# Patient Record
Sex: MALE | Race: WHITE | Employment: FULL TIME | URBAN - METROPOLITAN AREA
[De-identification: names, ages, dates, MRNs, and addresses within clinical notes are randomized per-mention and may not be internally consistent; named-entity substitution may affect disease eponyms.]

---

## 2017-03-31 ENCOUNTER — OFFICE VISIT (OUTPATIENT)
Dept: FAMILY MEDICINE CLINIC | Age: 52
End: 2017-03-31

## 2017-03-31 ENCOUNTER — HOSPITAL ENCOUNTER (OUTPATIENT)
Dept: LAB | Age: 52
Discharge: HOME | End: 2017-03-31
Payer: COMMERCIAL

## 2017-03-31 VITALS
WEIGHT: 254 LBS | BODY MASS INDEX: 33.66 KG/M2 | TEMPERATURE: 98.4 F | OXYGEN SATURATION: 93 % | HEART RATE: 88 BPM | HEIGHT: 73 IN | DIASTOLIC BLOOD PRESSURE: 88 MMHG | SYSTOLIC BLOOD PRESSURE: 140 MMHG

## 2017-03-31 DIAGNOSIS — E55.9 VITAMIN D DEFICIENCY: ICD-10-CM

## 2017-03-31 DIAGNOSIS — I10 ESSENTIAL HYPERTENSION: ICD-10-CM

## 2017-03-31 DIAGNOSIS — E78.5 HYPERLIPIDEMIA LDL GOAL <70: ICD-10-CM

## 2017-03-31 DIAGNOSIS — Z72.0 TOBACCO ABUSE: ICD-10-CM

## 2017-03-31 DIAGNOSIS — E11.69 DIABETES MELLITUS TYPE 2 IN OBESE (HCC): ICD-10-CM

## 2017-03-31 DIAGNOSIS — E66.9 DIABETES MELLITUS TYPE 2 IN OBESE (HCC): ICD-10-CM

## 2017-03-31 DIAGNOSIS — Z00.00 ANNUAL PHYSICAL EXAM: ICD-10-CM

## 2017-03-31 DIAGNOSIS — F41.9 ANXIETY: ICD-10-CM

## 2017-03-31 DIAGNOSIS — Z00.00 ANNUAL PHYSICAL EXAM: Primary | ICD-10-CM

## 2017-03-31 LAB
25(OH)D3 SERPL-MCNC: 19.1 NG/ML (ref 30–100)
ALBUMIN SERPL BCP-MCNC: 4.2 G/DL (ref 3.4–5)
ALBUMIN/GLOB SERPL: 1.4 {RATIO} (ref 0.8–1.7)
ALP SERPL-CCNC: 66 U/L (ref 45–117)
ALT SERPL-CCNC: 34 U/L (ref 16–61)
ANION GAP BLD CALC-SCNC: 10 MMOL/L (ref 3–18)
AST SERPL W P-5'-P-CCNC: 20 U/L (ref 15–37)
BASOPHILS # BLD AUTO: 0 K/UL (ref 0–0.06)
BASOPHILS # BLD: 0 % (ref 0–2)
BILIRUB SERPL-MCNC: 0.4 MG/DL (ref 0.2–1)
BUN SERPL-MCNC: 12 MG/DL (ref 7–18)
BUN/CREAT SERPL: 12 (ref 12–20)
CALCIUM SERPL-MCNC: 8.9 MG/DL (ref 8.5–10.1)
CHLORIDE SERPL-SCNC: 105 MMOL/L (ref 100–108)
CO2 SERPL-SCNC: 26 MMOL/L (ref 21–32)
CREAT SERPL-MCNC: 0.97 MG/DL (ref 0.6–1.3)
DIFFERENTIAL METHOD BLD: ABNORMAL
EOSINOPHIL # BLD: 0.2 K/UL (ref 0–0.4)
EOSINOPHIL NFR BLD: 2 % (ref 0–5)
ERYTHROCYTE [DISTWIDTH] IN BLOOD BY AUTOMATED COUNT: 12.4 % (ref 11.6–14.5)
ERYTHROCYTE [SEDIMENTATION RATE] IN BLOOD: 2 MM/HR (ref 0–15)
EST. AVERAGE GLUCOSE BLD GHB EST-MCNC: 134 MG/DL
GLOBULIN SER CALC-MCNC: 2.9 G/DL (ref 2–4)
GLUCOSE SERPL-MCNC: 127 MG/DL (ref 74–99)
HBA1C MFR BLD: 6.3 % (ref 4.2–5.6)
HCT VFR BLD AUTO: 45.5 % (ref 36–48)
HGB BLD-MCNC: 14.8 G/DL (ref 13–16)
LYMPHOCYTES # BLD AUTO: 19 % (ref 21–52)
LYMPHOCYTES # BLD: 1.4 K/UL (ref 0.9–3.6)
MCH RBC QN AUTO: 32.2 PG (ref 24–34)
MCHC RBC AUTO-ENTMCNC: 32.5 G/DL (ref 31–37)
MCV RBC AUTO: 98.9 FL (ref 74–97)
MONOCYTES # BLD: 0.9 K/UL (ref 0.05–1.2)
MONOCYTES NFR BLD AUTO: 11 % (ref 3–10)
NEUTS SEG # BLD: 5.3 K/UL (ref 1.8–8)
NEUTS SEG NFR BLD AUTO: 68 % (ref 40–73)
PLATELET # BLD AUTO: 204 K/UL (ref 135–420)
PMV BLD AUTO: 9.2 FL (ref 9.2–11.8)
POTASSIUM SERPL-SCNC: 4.7 MMOL/L (ref 3.5–5.5)
PROT SERPL-MCNC: 7.1 G/DL (ref 6.4–8.2)
RBC # BLD AUTO: 4.6 M/UL (ref 4.7–5.5)
SODIUM SERPL-SCNC: 141 MMOL/L (ref 136–145)
T4 FREE SERPL-MCNC: 1 NG/DL (ref 0.7–1.5)
TSH SERPL DL<=0.05 MIU/L-ACNC: 1.74 UIU/ML (ref 0.36–3.74)
WBC # BLD AUTO: 7.7 K/UL (ref 4.6–13.2)

## 2017-03-31 PROCEDURE — 80053 COMPREHEN METABOLIC PANEL: CPT | Performed by: INTERNAL MEDICINE

## 2017-03-31 PROCEDURE — 82172 ASSAY OF APOLIPOPROTEIN: CPT | Performed by: INTERNAL MEDICINE

## 2017-03-31 PROCEDURE — 83036 HEMOGLOBIN GLYCOSYLATED A1C: CPT | Performed by: INTERNAL MEDICINE

## 2017-03-31 PROCEDURE — 84443 ASSAY THYROID STIM HORMONE: CPT | Performed by: INTERNAL MEDICINE

## 2017-03-31 PROCEDURE — 80061 LIPID PANEL: CPT | Performed by: INTERNAL MEDICINE

## 2017-03-31 PROCEDURE — 36415 COLL VENOUS BLD VENIPUNCTURE: CPT | Performed by: INTERNAL MEDICINE

## 2017-03-31 PROCEDURE — 85652 RBC SED RATE AUTOMATED: CPT | Performed by: INTERNAL MEDICINE

## 2017-03-31 PROCEDURE — 85025 COMPLETE CBC W/AUTO DIFF WBC: CPT | Performed by: INTERNAL MEDICINE

## 2017-03-31 PROCEDURE — 84439 ASSAY OF FREE THYROXINE: CPT | Performed by: INTERNAL MEDICINE

## 2017-03-31 PROCEDURE — 82306 VITAMIN D 25 HYDROXY: CPT | Performed by: INTERNAL MEDICINE

## 2017-03-31 RX ORDER — LOSARTAN POTASSIUM 50 MG/1
50 TABLET ORAL DAILY
Qty: 90 TAB | Refills: 3 | Status: SHIPPED | OUTPATIENT
Start: 2017-03-31 | End: 2018-03-20 | Stop reason: SDUPTHER

## 2017-03-31 RX ORDER — SIMVASTATIN 10 MG/1
10 TABLET, FILM COATED ORAL
Qty: 90 TAB | Refills: 3 | Status: SHIPPED | OUTPATIENT
Start: 2017-03-31 | End: 2018-03-20 | Stop reason: SDUPTHER

## 2017-03-31 NOTE — PROGRESS NOTES
.     Progress Note    Patient: Nandini Sandoval MRN: [id number]  SSN: [id number]    YOB: 1965  Age: 54 y.o. Sex: male          Subjective:   Nandini Sandoval is a 54 y.o. male who is here for annual physical. The patient mentions that he got a new job at [employer]. He states that the work environment. 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 He states that his new position also very expansive. He saw his dermatologist a few weeks ago and they did a biopsy under his chin and behind his ear. In regard to his diabetes he has been watching his diet and trying to cut back on cheese. He states that he has also been increasing his vegetable intake. He states that he is not getting the same amount of exercise that he did on his previous position. He states that at his job he has a gym and rarely goes to it. On occasion he will walk on the treadmill at the gym at work. Patient denies any more tremors in his hand. Objective:     Past Medical History:   Diagnosis Date    Diabetes ([address]     Eczema     History of seasonal allergies       Visit Vitals    /88 (BP 1 Location: Left arm, BP Patient Position: Sitting)    Pulse 88    Temp 98.4 °F (36.9 °C) (Oral)    Ht 6' 1\" (1.854 m)    Wt 254 lb (115.2 kg)    SpO2 93%    BMI 33.51 kg/m2       Review of Systems:  Pertinent ROS noted in HPI     Physical Exam:   GENERAL: alert, cooperative, no distress, appears stated age, moderately obese  EYE: conjunctivae/corneas clear. PERRL, EOM's intact. Fundi benign  LYMPHATIC: Cervical, supraclavicular, and axillary nodes normal.   THROAT & NECK: normal and no erythema or exudates noted. LUNG: clear to auscultation bilaterally  HEART: regular rate and rhythm, S1, S2 normal, no murmur, click, rub or gallop  ABDOMEN: soft, non-tender. Bowel sounds normal. No masses,  no organomegaly.   EXTREMITIES:  No edema or cyanosis   SKIN: Eczematous patches noted on face and dorsal aspects of hands. Lab/Data Review:    Lab Results   Component Value Date/Time    Hemoglobin A1c 6.0 08/02/2016 12:07 PM    Hemoglobin A1c (POC) 6.6 09/29/2011 10:22 AM     Lab Results   Component Value Date/Time    VITAMIN D, 25-HYDROXY 32.8 11/10/2015 03:13 PM         Lab Results   Component Value Date/Time    WBC 7.7 11/10/2015 03:13 PM    HGB 14.2 11/10/2015 03:13 PM    HCT 41.5 11/10/2015 03:13 PM    PLATELET 370 75/55/6449 03:13 PM    MCV 91 11/10/2015 03:13 PM       Lab Results   Component Value Date/Time    Sodium 140 11/11/2016 10:31 AM    Potassium 3.8 11/11/2016 10:31 AM    Chloride 100 11/11/2016 10:31 AM    CO2 28 11/11/2016 10:31 AM    Anion gap 12 11/11/2016 10:31 AM    Glucose 140 11/11/2016 10:31 AM    BUN 11 11/11/2016 10:31 AM    Creatinine 1.07 11/11/2016 10:31 AM    BUN/Creatinine ratio 10 11/11/2016 10:31 AM    GFR est AA >60 11/11/2016 10:31 AM    GFR est non-AA >60 11/11/2016 10:31 AM    Calcium 9.3 11/11/2016 10:31 AM    Bilirubin, total 1.2 11/11/2016 10:31 AM    AST (SGOT) 23 11/11/2016 10:31 AM    Alk. phosphatase 65 11/11/2016 10:31 AM    Protein, total 7.0 11/11/2016 10:31 AM    Albumin 4.2 11/11/2016 10:31 AM    Globulin 2.8 11/11/2016 10:31 AM    A-G Ratio 1.5 11/11/2016 10:31 AM    ALT (SGPT) 39 11/11/2016 10:31 AM     Lab Results   Component Value Date/Time    Cholesterol, total 176 11/11/2016 10:31 AM    HDL Cholesterol 79 11/11/2016 10:31 AM    LDL, calculated 74 11/11/2016 10:31 AM    VLDL, calculated 14 06/23/2015 10:03 AM    Triglyceride 116 11/11/2016 10:31 AM    CHOL/HDL Ratio 3.7 06/10/2010 03:03 PM           Assessment:     1.) Annual Physical: No acute findings on exam. The patient declined preventive immunizations. Labs ordered as noted below. 2.) Diabetes Mellitus Type 2:  HgbA1C drawn in the office today. He will continue lifestyle change. Urine microalbumin drawn in the office today.          3.) Hypertension:  He was provided with BP log to record blood pressure readings. Patient will continue on Losartan which was reordered. 4.) Tremulousness: Resolved. 5.) Hypercholesterolemia: Lipid cascade drawn in the office today. Patient's Simvastatin 10 mg daily was reordered. 6.) Eczema: Stable on Temovate. He will continue to follow with Dermatology. 7.) Tobacco Abuse:  Patient once more admonished to quit. Patient will return in 6 months for follow up.      Plan:     Orders Placed This Encounter    LIPID CASCADE W/REFLEX APO B    METABOLIC PANEL, COMPREHENSIVE    CBC WITH AUTOMATED DIFF    SED RATE (ESR)    TSH 3RD GENERATION    T4, FREE    HEMOGLOBIN A1C WITH EAG    VITAMIN D, 25 HYDROXY    MICROALBUMIN, UR, RAND W/ MICROALBUMIN/CREA RATIO    simvastatin (ZOCOR) 10 mg tablet    losartan (COZAAR) 50 mg tablet         Signed By: Pro Durant DO     March 31, 2017

## 2017-03-31 NOTE — MR AVS SNAPSHOT
Visit Information Date & Time Provider Department Dept. Phone Encounter #  
 3/31/2017  8:30 AM [id number] Colleen Macias [id number] Follow-up Instructions Return in about 6 months (around 9/30/2017) for Regular Follow Up. Dariel Tong Upcoming Health Maintenance Date Due  
 EYE EXAM RETINAL OR DILATED Q1 8/25/1977 Pneumococcal 19-64 Medium Risk (1 of 1 - PPSV23) 8/25/1986 MICROALBUMIN Q1 6/23/2016 INFLUENZA AGE 9 TO ADULT 8/1/2016 FOOT EXAM Q1 12/3/2016 HEMOGLOBIN A1C Q6M 2/2/2017 LIPID PANEL Q1 11/11/2017 DTaP/Tdap/Td series (2 - Td) 3/31/2025 Allergies as of 3/31/2017  Review Complete On: 3/31/2017 By: Raoul Mckeon LPN No Known Allergies Current Immunizations  Reviewed on 3/31/2015 Name Date Influenza Vaccine 10/30/2014 Tdap 3/31/2015 Not reviewed this visit You Were Diagnosed With   
  
 Codes Comments Annual physical exam    -  Primary ICD-10-CM: Z00.00 ICD-9-CM: V70.0 Hyperlipidemia LDL goal <70     ICD-10-CM: E78.5 ICD-9-CM: 272.4 Diabetes mellitus type 2 in obese (HCC)     ICD-10-CM: E11.9, E66.9 ICD-9-CM: 250.00, 278.00 Anxiety     ICD-10-CM: F41.9 ICD-9-CM: 300.00 Elevated blood pressure     ICD-10-CM: I10 
ICD-9-CM: 401.9 Essential hypertension     ICD-10-CM: I10 
ICD-9-CM: 401.9 Vitamin D deficiency     ICD-10-CM: E55.9 ICD-9-CM: 268.9 Vitals BP Pulse Temp Height(growth percentile) Weight(growth percentile) SpO2  
 140/88 (BP 1 Location: Left arm, BP Patient Position: Sitting) 88 98.4 °F (36.9 °C) (Oral) 6' 1\" (1.854 m) 254 lb (115.2 kg) 93% BMI Smoking Status 33.51 kg/m2 Current Every Day Smoker Vitals History BMI and BSA Data Body Mass Index Body Surface Area  
 33.51 kg/m 2 2.44 m 2 Preferred Pharmacy Pharmacy Name Phone  [address] [facility] [phone] Your Updated Medication List  
  
   
This list is accurate as of: 3/31/17  9:04 AM.  Always use your most recent med list. ALLEGRA-D 24 HOUR PO Take  by mouth daily. Blood Glucose Strip-Disp Meter Kit 120 Strips by Does Not Apply route daily. Blood-Glucose Meter monitoring kit Check Blood Sugar once daily  
  
 clobetasol 0.05 % ointment Commonly known as:  TEMOVATE  
APPLY TO THE AFFECTED AREA TWICE DAILY  
  
 diazePAM 5 mg tablet Commonly known as:  VALIUM Take 1 Tab by mouth every twelve (12) hours as needed. EPINEPHrine 0.3 mg/0.3 mL injection Commonly known as:  EPIPEN  
0.3 mL by IntraMUSCular route once as needed. glucose blood VI test strips strip Commonly known as:  ASCENSIA AUTODISC VI, ONE TOUCH ULTRA TEST VI Check Blood Sugar Fasting Once a day  
  
 lancets 32 gauge Misc  
1 Package by Does Not Apply route daily. losartan 50 mg tablet Commonly known as:  COZAAR Take 1 Tab by mouth daily. metFORMIN 500 mg tablet Commonly known as:  GLUCOPHAGE Take 1 Tab by mouth daily (with breakfast). mometasone 50 mcg/actuation nasal spray Commonly known as:  NASONEX  
INHALE 1 TO 2 SPRAYS IN EACH NOSTRIL DAILY  
  
 olopatadine 0.1 % ophthalmic solution Commonly known as:  PATANOL Administer 1 Drop to both eyes two (2) times a day. simvastatin 10 mg tablet Commonly known as:  ZOCOR Take 1 Tab by mouth nightly. Prescriptions Sent to Pharmacy Refills  
 simvastatin (ZOCOR) 10 mg tablet 3 Sig: Take 1 Tab by mouth nightly. Class: Normal  
 Pharmacy: [address], [address] Ph #: [phone] Route: Oral  
 losartan (COZAAR) 50 mg tablet 3 Sig: Take 1 Tab by mouth daily. Class: Normal  
 Pharmacy: [address], [address] Ph #: [phone]  Route: Oral  
  
 Follow-up Instructions Return in about 6 months (around 9/30/2017) for Regular Follow Up. Iván Lopez To-Do List   
 03/31/2017 Lab:  HEMOGLOBIN A1C WITH EAG   
  
 03/31/2017 Lab:  LIPID CASCADE W/REFLEX APO B   
  
 03/31/2017 Lab:  MICROALBUMIN, UR, RAND W/ MICROALBUMIN/CREA RATIO   
  
 03/31/2017 Lab:  VITAMIN D, 25 HYDROXY Around 06/29/2017 Lab:  CBC WITH AUTOMATED DIFF Around 06/29/2017 Lab:  METABOLIC PANEL, COMPREHENSIVE Around 06/29/2017 Lab:  SED RATE (ESR) Around 06/29/2017 Lab:  T4, FREE Around 06/29/2017 Lab:  TSH 3RD GENERATION Patient Instructions 1.) Continue to Monitor blood pressure. Goal is to keep it below 130/80. 
 
2.) Continue to cut down on cigarette smoking.  
 
3.) Continue to use cholesterol meds. 4.) Elevate legs at the end of the day. 5.) If leg pain worsens let us know. Tennis Elbow: Exercises Your Care Instructions Here are some examples of typical rehabilitation exercises for your condition. Start each exercise slowly. Ease off the exercise if you start to have pain. Your doctor or physical therapist will tell you when you can start these exercises and which ones will work best for you. How to do the exercises Wrist flexor stretch 1. Extend your arm in front of you with your palm up. 2. Bend your wrist, pointing your hand toward the floor. 3. With your other hand, gently bend your wrist farther until you feel a mild to moderate stretch in your forearm. 4. Hold for at least 15 to 30 seconds. Repeat 2 to 4 times. Wrist extensor stretch Repeat steps 1 to 4 of the stretch above but begin with your extended hand palm down. Ball or sock squeeze 1. Hold a tennis ball (or a rolled-up sock) in your hand. 2. Make a fist around the ball (or sock) and squeeze. 3. Hold for about 6 seconds, and then relax for up to 10 seconds. 4. Repeat 8 to 12 times. 5. Switch the ball (or sock) to your other hand and do 8 to 12 times. Wrist deviation 1. Sit so that your arm is supported but your hand hangs off the edge of a flat surface, such as a table. 2. Hold your hand out like you are shaking hands with someone. 3. Move your hand up and down. 4. Repeat this motion 8 to 12 times. 5. Switch arms. 6. Try to do this exercise twice with each hand. Wrist curls 1. Place your forearm on a table with your hand hanging over the edge of the table, palm up. 2. Place a 1- to 2-pound weight in your hand. This may be a dumbbell, a can of food, or a filled water bottle. 3. Slowly raise and lower the weight while keeping your forearm on the table and your palm facing up. 4. Repeat this motion 8 to 12 times. 5. Switch arms, and do steps 1 through 4. 
6. Repeat with your hand facing down toward the floor. Switch arms. Biceps curls 1. Sit leaning forward with your legs slightly spread and your left hand on your left thigh. 2. Place your right elbow on your right thigh, and hold the weight with your forearm horizontal. 
3. Slowly curl the weight up and toward your chest. 
4. Repeat this motion 8 to 12 times. 5. Switch arms, and do steps 1 through 4. Follow-up care is a key part of your treatment and safety. Be sure to make and go to all appointments, and call your doctor if you are having problems. It's also a good idea to know your test results and keep a list of the medicines you take. Where can you learn more? Go to http://kaye-tanvir.info/. Enter D919 in the search box to learn more about \"Tennis Elbow: Exercises. \" Current as of: May 23, 2016 Content Version: 11.2 © 5598-1906 [email or web address], Incorporated. Care instructions adapted under license by [employer] (which disclaims liability or warranty for this information).  If you have questions about a medical condition or this instruction, always ask your healthcare professional. [address] any warranty or liability for your use of this information. Introducing [facility] [place] [facility]! Samm Purvis introduces [employer] patient portal. Now you can access parts of your medical record, email your doctor's office, and request medication refills online. 1. In your internet browser, go to [email or web address] 2. Click on the First Time User? Click Here link in the Sign In box. You will see the New Member Sign Up page. 3. Enter your [employer] Access Code exactly as it appears below. You will not need to use this code after youve completed the sign-up process. If you do not sign up before the expiration date, you must request a new code. · [employer] Access Code: [email or web address] Expires: 6/29/2017  8:16 AM 
 
4. Enter the last four digits of your Social Security Number (xxxx) and Date of Birth (mm/dd/yyyy) as indicated and click Submit. You will be taken to the next sign-up page. 5. Create a [employer] ID. This will be your [employer] login ID and cannot be changed, so think of one that is secure and easy to remember. 6. Create a [employer] password. You can change your password at any time. 7. Enter your Password Reset Question and Answer. This can be used at a later time if you forget your password. 8. Enter your e-mail address. You will receive e-mail notification when new information is available in [address] 9. Click Sign Up. You can now view and download portions of your medical record. 10. Click the Download Summary menu link to download a portable copy of your medical information. If you have questions, please visit the Frequently Asked Questions section of the [employer] website. Remember, [employer] is NOT to be used for urgent needs. For medical emergencies, dial 911. Now available from your iPhone and Android! Please provide this summary of care documentation to your next provider. Your primary care clinician is listed as Jasiel Multani. If you have any questions after today's visit, please call [phone].

## 2017-03-31 NOTE — PATIENT INSTRUCTIONS
1. ) Continue to Monitor blood pressure. Goal is to keep it below 130/80.    2.) Continue to cut down on cigarette smoking.     3.) Continue to use cholesterol meds. 4.) Elevate legs at the end of the day. 5.) If leg pain worsens let us know. Tennis Elbow: Exercises  Your Care Instructions  Here are some examples of typical rehabilitation exercises for your condition. Start each exercise slowly. Ease off the exercise if you start to have pain. Your doctor or physical therapist will tell you when you can start these exercises and which ones will work best for you. How to do the exercises  Wrist flexor stretch    1. Extend your arm in front of you with your palm up. 2. Bend your wrist, pointing your hand toward the floor. 3. With your other hand, gently bend your wrist farther until you feel a mild to moderate stretch in your forearm. 4. Hold for at least 15 to 30 seconds. Repeat 2 to 4 times. Wrist extensor stretch    Repeat steps 1 to 4 of the stretch above but begin with your extended hand palm down. Ball or sock squeeze    1. Hold a tennis ball (or a rolled-up sock) in your hand. 2. Make a fist around the ball (or sock) and squeeze. 3. Hold for about 6 seconds, and then relax for up to 10 seconds. 4. Repeat 8 to 12 times. 5. Switch the ball (or sock) to your other hand and do 8 to 12 times. Wrist deviation    1. Sit so that your arm is supported but your hand hangs off the edge of a flat surface, such as a table. 2. Hold your hand out like you are shaking hands with someone. 3. Move your hand up and down. 4. Repeat this motion 8 to 12 times. 5. Switch arms. 6. Try to do this exercise twice with each hand. Wrist curls    1. Place your forearm on a table with your hand hanging over the edge of the table, palm up. 2. Place a 1- to 2-pound weight in your hand. This may be a dumbbell, a can of food, or a filled water bottle.   3. Slowly raise and lower the weight while keeping your forearm on the table and your palm facing up. 4. Repeat this motion 8 to 12 times. 5. Switch arms, and do steps 1 through 4.  6. Repeat with your hand facing down toward the floor. Switch arms. Biceps curls    1. Sit leaning forward with your legs slightly spread and your left hand on your left thigh. 2. Place your right elbow on your right thigh, and hold the weight with your forearm horizontal.  3. Slowly curl the weight up and toward your chest.  4. Repeat this motion 8 to 12 times. 5. Switch arms, and do steps 1 through 4. Follow-up care is a key part of your treatment and safety. Be sure to make and go to all appointments, and call your doctor if you are having problems. It's also a good idea to know your test results and keep a list of the medicines you take. Where can you learn more? Go to http://kaye-tanvir.info/. Enter H802 in the search box to learn more about \"Tennis Elbow: Exercises. \"  Current as of: May 23, 2016  Content Version: 11.2  © 5403-3830 [employer], Incorporated. Care instructions adapted under license by [employer] (which disclaims liability or warranty for this information). If you have questions about a medical condition or this instruction, always ask your healthcare professional. [address] any warranty or liability for your use of this information.

## 2017-03-31 NOTE — PROGRESS NOTES
1. Have you been to the ER, urgent care clinic since your last visit? Hospitalized since your last visit? No    2. Have you seen or consulted any other health care providers outside of the [facility] since your last visit? Include any pap smears or colon screening.  No    Is someone accompanying this pt? no    Is the patient using any DME equipment during OV? no      Chief Complaint   Patient presents with    Complete Physical

## 2017-04-02 ENCOUNTER — TELEPHONE (OUTPATIENT)
Dept: FAMILY MEDICINE CLINIC | Age: 52
End: 2017-04-02

## 2017-04-02 NOTE — TELEPHONE ENCOUNTER
Shad Kaufman,   Please let the patient know that his HgbA1C crept up to 6.3. He won't have to go back to metformin but he needs to be very strict with his diet and exercise regimen. I don't want him to get back to up to 6.5. Thanks.     LUISA

## 2017-04-05 LAB
APO B SERPL-MCNC: 85 MG/DL (ref 52–135)
CHOLEST SERPL-MCNC: 158 MG/DL (ref 100–199)
HDLC SERPL-MCNC: 66 MG/DL
LDLC SERPL CALC-MCNC: 71 MG/DL (ref 0–99)
LDLC/HDLC SERPL: 1.1 RATIO UNITS (ref 0–3.6)
NONHDLC SERPL-MCNC: 92 MG/DL (ref 0–129)
TRIGL SERPL-MCNC: 107 MG/DL (ref 0–149)

## 2017-04-06 ENCOUNTER — TELEPHONE (OUTPATIENT)
Dept: FAMILY MEDICINE CLINIC | Age: 52
End: 2017-04-06

## 2018-06-08 ENCOUNTER — HOSPITAL ENCOUNTER (OUTPATIENT)
Dept: LAB | Age: 53
Discharge: HOME | End: 2018-06-08
Payer: COMMERCIAL

## 2018-06-08 ENCOUNTER — OFFICE VISIT (OUTPATIENT)
Dept: FAMILY MEDICINE CLINIC | Age: 53
End: 2018-06-08

## 2018-06-08 VITALS
HEART RATE: 70 BPM | HEIGHT: 73 IN | DIASTOLIC BLOOD PRESSURE: 87 MMHG | TEMPERATURE: 98.1 F | OXYGEN SATURATION: 98 % | RESPIRATION RATE: 17 BRPM | SYSTOLIC BLOOD PRESSURE: 152 MMHG | WEIGHT: 257 LBS | BODY MASS INDEX: 34.06 KG/M2

## 2018-06-08 DIAGNOSIS — F41.9 ANXIETY: ICD-10-CM

## 2018-06-08 DIAGNOSIS — I10 ESSENTIAL HYPERTENSION: ICD-10-CM

## 2018-06-08 DIAGNOSIS — E11.9 TYPE 2 DIABETES MELLITUS WITHOUT COMPLICATION, WITHOUT LONG-TERM CURRENT USE OF INSULIN (HCC): ICD-10-CM

## 2018-06-08 DIAGNOSIS — Z00.00 ANNUAL PHYSICAL EXAM: Primary | ICD-10-CM

## 2018-06-08 DIAGNOSIS — E55.9 VITAMIN D DEFICIENCY: ICD-10-CM

## 2018-06-08 DIAGNOSIS — E08.00 DIABETES MELLITUS DUE TO UNDERLYING CONDITION WITH HYPEROSMOLARITY WITHOUT COMA, UNSPECIFIED WHETHER LONG TERM INSULIN USE (HCC): ICD-10-CM

## 2018-06-08 DIAGNOSIS — E66.9 DIABETES MELLITUS TYPE 2 IN OBESE (HCC): ICD-10-CM

## 2018-06-08 DIAGNOSIS — E11.69 DIABETES MELLITUS TYPE 2 IN OBESE (HCC): ICD-10-CM

## 2018-06-08 DIAGNOSIS — E78.5 HYPERLIPIDEMIA LDL GOAL <70: ICD-10-CM

## 2018-06-08 DIAGNOSIS — Z00.00 ANNUAL PHYSICAL EXAM: ICD-10-CM

## 2018-06-08 DIAGNOSIS — Z12.11 SCREEN FOR COLON CANCER: ICD-10-CM

## 2018-06-08 LAB
ALBUMIN SERPL-MCNC: 4.1 G/DL (ref 3.4–5)
ALBUMIN/GLOB SERPL: 1.5 {RATIO} (ref 0.8–1.7)
ALP SERPL-CCNC: 73 U/L (ref 45–117)
ALT SERPL-CCNC: 37 U/L (ref 16–61)
ANION GAP SERPL CALC-SCNC: 10 MMOL/L (ref 3–18)
AST SERPL-CCNC: 21 U/L (ref 15–37)
BASOPHILS # BLD: 0 K/UL (ref 0–0.06)
BASOPHILS NFR BLD: 0 % (ref 0–2)
BILIRUB SERPL-MCNC: 0.7 MG/DL (ref 0.2–1)
BUN SERPL-MCNC: 9 MG/DL (ref 7–18)
BUN/CREAT SERPL: 8 (ref 12–20)
CALCIUM SERPL-MCNC: 9.4 MG/DL (ref 8.5–10.1)
CHLORIDE SERPL-SCNC: 104 MMOL/L (ref 100–108)
CHOLEST SERPL-MCNC: 183 MG/DL
CO2 SERPL-SCNC: 29 MMOL/L (ref 21–32)
CREAT SERPL-MCNC: 1.06 MG/DL (ref 0.6–1.3)
DIFFERENTIAL METHOD BLD: ABNORMAL
EOSINOPHIL # BLD: 0.1 K/UL (ref 0–0.4)
EOSINOPHIL NFR BLD: 1 % (ref 0–5)
ERYTHROCYTE [DISTWIDTH] IN BLOOD BY AUTOMATED COUNT: 13.1 % (ref 11.6–14.5)
ERYTHROCYTE [SEDIMENTATION RATE] IN BLOOD: 2 MM/HR (ref 0–15)
EST. AVERAGE GLUCOSE BLD GHB EST-MCNC: 157 MG/DL
GLOBULIN SER CALC-MCNC: 2.8 G/DL (ref 2–4)
GLUCOSE SERPL-MCNC: 118 MG/DL (ref 74–99)
HBA1C MFR BLD: 7.1 % (ref 4.2–5.6)
HCT VFR BLD AUTO: 44.4 % (ref 36–48)
HDLC SERPL-MCNC: 66 MG/DL (ref 40–60)
HDLC SERPL: 2.8 {RATIO} (ref 0–5)
HGB BLD-MCNC: 14.4 G/DL (ref 13–16)
LDLC SERPL CALC-MCNC: 94.8 MG/DL (ref 0–100)
LIPID PROFILE,FLP: ABNORMAL
LYMPHOCYTES # BLD: 2.1 K/UL (ref 0.9–3.6)
LYMPHOCYTES NFR BLD: 26 % (ref 21–52)
MCH RBC QN AUTO: 31.4 PG (ref 24–34)
MCHC RBC AUTO-ENTMCNC: 32.4 G/DL (ref 31–37)
MCV RBC AUTO: 96.9 FL (ref 74–97)
MONOCYTES # BLD: 0.7 K/UL (ref 0.05–1.2)
MONOCYTES NFR BLD: 8 % (ref 3–10)
NEUTS SEG # BLD: 5.4 K/UL (ref 1.8–8)
NEUTS SEG NFR BLD: 65 % (ref 40–73)
PLATELET # BLD AUTO: 175 K/UL (ref 135–420)
PMV BLD AUTO: 8.8 FL (ref 9.2–11.8)
POTASSIUM SERPL-SCNC: 4.1 MMOL/L (ref 3.5–5.5)
PROT SERPL-MCNC: 6.9 G/DL (ref 6.4–8.2)
PSA SERPL-MCNC: 0.8 NG/ML (ref 0–4)
RBC # BLD AUTO: 4.58 M/UL (ref 4.7–5.5)
SODIUM SERPL-SCNC: 143 MMOL/L (ref 136–145)
TRIGL SERPL-MCNC: 111 MG/DL (ref ?–150)
VLDLC SERPL CALC-MCNC: 22.2 MG/DL
WBC # BLD AUTO: 8.3 K/UL (ref 4.6–13.2)

## 2018-06-08 PROCEDURE — 85025 COMPLETE CBC W/AUTO DIFF WBC: CPT | Performed by: INTERNAL MEDICINE

## 2018-06-08 PROCEDURE — 85652 RBC SED RATE AUTOMATED: CPT | Performed by: INTERNAL MEDICINE

## 2018-06-08 PROCEDURE — 80061 LIPID PANEL: CPT | Performed by: INTERNAL MEDICINE

## 2018-06-08 PROCEDURE — 83036 HEMOGLOBIN GLYCOSYLATED A1C: CPT | Performed by: INTERNAL MEDICINE

## 2018-06-08 PROCEDURE — 36415 COLL VENOUS BLD VENIPUNCTURE: CPT | Performed by: INTERNAL MEDICINE

## 2018-06-08 PROCEDURE — 84153 ASSAY OF PSA TOTAL: CPT | Performed by: INTERNAL MEDICINE

## 2018-06-08 PROCEDURE — 82043 UR ALBUMIN QUANTITATIVE: CPT | Performed by: INTERNAL MEDICINE

## 2018-06-08 PROCEDURE — 82306 VITAMIN D 25 HYDROXY: CPT | Performed by: INTERNAL MEDICINE

## 2018-06-08 PROCEDURE — 80053 COMPREHEN METABOLIC PANEL: CPT | Performed by: INTERNAL MEDICINE

## 2018-06-08 RX ORDER — SIMVASTATIN 10 MG/1
TABLET, FILM COATED ORAL
Qty: 90 TAB | Refills: 1 | Status: SHIPPED | OUTPATIENT
Start: 2018-06-08 | End: 2018-07-13 | Stop reason: SDUPTHER

## 2018-06-08 RX ORDER — METFORMIN HYDROCHLORIDE 500 MG/1
500 TABLET ORAL
Qty: 90 TAB | Refills: 1 | Status: SHIPPED | OUTPATIENT
Start: 2018-06-08 | End: 2018-07-13 | Stop reason: SDUPTHER

## 2018-06-08 RX ORDER — CLOBETASOL PROPIONATE 0.5 MG/G
OINTMENT TOPICAL
Qty: 30 G | Refills: 3 | Status: SHIPPED | OUTPATIENT
Start: 2018-06-08 | End: 2019-07-19 | Stop reason: ALTCHOICE

## 2018-06-08 RX ORDER — LOSARTAN POTASSIUM 50 MG/1
TABLET ORAL
Qty: 90 TAB | Refills: 1 | Status: SHIPPED | OUTPATIENT
Start: 2018-06-08 | End: 2018-07-13 | Stop reason: SDUPTHER

## 2018-06-08 RX ORDER — DIAZEPAM 5 MG/1
5 TABLET ORAL
Qty: 60 TAB | Refills: 0 | Status: SHIPPED | OUTPATIENT
Start: 2018-06-08 | End: 2019-03-13 | Stop reason: SDUPTHER

## 2018-06-08 NOTE — PATIENT INSTRUCTIONS
Please contact our office if you have any questions about your visit today. 1.) Please keep a log of your blood pressures. The goal is to keep it below 140/90 regularly. 2.) Bring log back with you on next visit. We will determine if you need an adjustment on your BP meds. 3.) Please call GI for colonoscopy screening.

## 2018-06-08 NOTE — PROGRESS NOTES
.         Progress Note    Patient: Kendal Santos MRN: [id number]  SSN: [id number]    YOB: 1965  Age: 50 y.o. Sex: male          Subjective:   Kendal Santos is a 50 y.o. male who is here for annual physical. The patient mentions that he got a new job at [employer]. He mentions that this new position is less stressful for him. He mentions that he checks his BP at home. He does get numbers in the 140 range. He also asks for refills on his maintenance medications. Visit from 3/31/2017   He states that the work environment. 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 He states that his new position also very expansive. He saw his dermatologist a few weeks ago and they did a biopsy under his chin and behind his ear. In regard to his diabetes he has been watching his diet and trying to cut back on cheese. He states that he has also been increasing his vegetable intake. He states that he is not getting the same amount of exercise that he did on his previous position. He states that at his job he has a gym and rarely goes to it. On occasion he will walk on the treadmill at the gym at work. Patient denies any more tremors in his hand. Objective:     Past Medical History:   Diagnosis Date    Diabetes ([address]     Eczema     History of seasonal allergies       Visit Vitals    /87 (BP 1 Location: Right arm, BP Patient Position: Sitting)    Pulse 70    Temp 98.1 °F (36.7 °C) (Oral)    Resp 17    Ht 6' 1\" (1.854 m)    Wt 257 lb (116.6 kg)    SpO2 98%    BMI 33.91 kg/m2       Review of Systems:  Pertinent ROS noted in HPI     Physical Exam:   GENERAL: alert, cooperative, no distress, appears stated age, moderately obese  EYE: conjunctivae/corneas clear. PERRL, EOM's intact. Fundi benign  LYMPHATIC: Cervical, supraclavicular, and axillary nodes normal.   THROAT & NECK: normal and no erythema or exudates noted.    LUNG: clear to auscultation bilaterally  HEART: regular rate and rhythm, S1, S2 normal, no murmur, click, rub or gallop  ABDOMEN: soft, non-tender. Bowel sounds normal. No masses,  no organomegaly. EXTREMITIES:  No edema or cyanosis   SKIN: Eczematous patches noted on face and dorsal aspects of hands. Lab/Data Review:    Lab Results   Component Value Date/Time    Hemoglobin A1c 6.3 (H) 03/31/2017 09:06 AM    Hemoglobin A1c (POC) 6.6 09/29/2011 10:22 AM     Lab Results   Component Value Date/Time    Vitamin D 25-Hydroxy 19.1 (L) 03/31/2017 09:06 AM         Lab Results   Component Value Date/Time    WBC 7.7 03/31/2017 09:06 AM    HGB 14.8 03/31/2017 09:06 AM    HCT 45.5 03/31/2017 09:06 AM    PLATELET 494 83/97/8485 09:06 AM    MCV 98.9 (H) 03/31/2017 09:06 AM       Lab Results   Component Value Date/Time    Sodium 141 03/31/2017 09:06 AM    Potassium 4.7 03/31/2017 09:06 AM    Chloride 105 03/31/2017 09:06 AM    CO2 26 03/31/2017 09:06 AM    Anion gap 10 03/31/2017 09:06 AM    Glucose 127 (H) 03/31/2017 09:06 AM    BUN 12 03/31/2017 09:06 AM    Creatinine 0.97 03/31/2017 09:06 AM    BUN/Creatinine ratio 12 03/31/2017 09:06 AM    GFR est AA >60 03/31/2017 09:06 AM    GFR est non-AA >60 03/31/2017 09:06 AM    Calcium 8.9 03/31/2017 09:06 AM    Bilirubin, total 0.4 03/31/2017 09:06 AM    AST (SGOT) 20 03/31/2017 09:06 AM    Alk.  phosphatase 66 03/31/2017 09:06 AM    Protein, total 7.1 03/31/2017 09:06 AM    Albumin 4.2 03/31/2017 09:06 AM    Globulin 2.9 03/31/2017 09:06 AM    A-G Ratio 1.4 03/31/2017 09:06 AM    ALT (SGPT) 34 03/31/2017 09:06 AM     Lab Results   Component Value Date/Time    Cholesterol, total 158 03/31/2017 09:06 AM    HDL Cholesterol 66 03/31/2017 09:06 AM    LDL, calculated 71 03/31/2017 09:06 AM    VLDL, calculated 14 06/23/2015 10:03 AM    Triglyceride 107 03/31/2017 09:06 AM    CHOL/HDL Ratio 3.7 06/10/2010 03:03 PM           Assessment:     1.) Annual Physical: No acute findings on exam.     2.) Hypertension: Patient was advised to log his blood pressure readings. Patient will continue on Losartan which was reordered. If readings are still high then I will increase his losartan to 100 mg.      3.) Diabetes Mellitus Type 2: Patient was reordered metformin but he did note the fact that he has not been on the medication since last year. HgbA1C drawn in the office today. He will continue lifestyle change. 4.) Hypercholesterolemia: Lipid panel drawn in the office today. Patient's Simvastatin 10 mg daily was reordered. 5.) Eczema: Temovate reordered. He will continue to follow with Dermatology. 6.) Intermittent Anxiety: Patient's Valium reordered. 7.) Tobacco Abuse: On next visit will discuss quitting techniques. 8.) Preventive Screenings: Labs ordered as noted below. Patient referred to GI for screening colonoscopy. PSA ordered. Patient will return in 1 month for follow up on blood pressure readings.      Plan:     Orders Placed This Encounter    LIPID PANEL    VITAMIN D, 25 HYDROXY    HEMOGLOBIN A1C WITH EAG    METABOLIC PANEL, COMPREHENSIVE    CBC WITH AUTOMATED DIFF    SED RATE (ESR)    PSA SCREENING ()    MICROALBUMIN, UR, RAND W/ MICROALB/CREAT RATIO    REFERRAL TO GASTROENTEROLOGY    simvastatin (ZOCOR) 10 mg tablet    losartan (COZAAR) 50 mg tablet    clobetasol (TEMOVATE) 0.05 % ointment    metFORMIN (GLUCOPHAGE) 500 mg tablet    diazePAM (VALIUM) 5 mg tablet         Signed By: [id number] Rome Edgar,      June 8, 2018

## 2018-06-08 NOTE — PROGRESS NOTES
Chief Complaint   Patient presents with    Hypertension    Diabetes    Complete Physical     1. Have you been to the ER, urgent care clinic since your last visit? Hospitalized since your last visit? No    2. Have you seen or consulted any other health care providers outside of the [address] [place] [address] since your last visit? Include any pap smears or colon screening.  No

## 2018-06-08 NOTE — MR AVS SNAPSHOT
[address] [place] [address] 
 
 
 [address] [address] [address] [address] [phone] Patient: Chiqui Ojeda 
MRN: [id number] :1965 Visit Information Date & Time Provider Department Dept. Phone Encounter #  
 2018 11:30 AM [facility] [phone] [id number] Follow-up Instructions Return for Follow up on blood pressures . Upcoming Health Maintenance Date Due  
 EYE EXAM RETINAL OR DILATED Q1 1977 Pneumococcal 19-64 Medium Risk (1 of 1 - PPSV23) 1986 MICROALBUMIN Q1 2016 FOOT EXAM Q1 12/3/2016 FOBT Q 1 YEAR AGE 50-75 2017 HEMOGLOBIN A1C Q6M 2017 LIPID PANEL Q1 3/31/2018 Influenza Age 5 to Adult 2018 DTaP/Tdap/Td series (2 - Td) 3/31/2025 Allergies as of 2018  Review Complete On: 2018 By: Dejan Acevedo,  Severity Noted Reaction Type Reactions Latex  2018    Hives Bee Pollen High 2018    Anaphylaxis Current Immunizations  Reviewed on 3/31/2015 Name Date Influenza Vaccine 10/30/2014 Tdap 3/31/2015 Not reviewed this visit You Were Diagnosed With   
  
 Codes Comments Hyperlipidemia LDL goal <70    -  Primary ICD-10-CM: E78.5 ICD-9-CM: 272.4 Annual physical exam     ICD-10-CM: Z00.00 ICD-9-CM: V70.0 Diabetes mellitus type 2 in [facility])     ICD-10-CM: E11.69, E66.9 ICD-9-CM: 250.00, 278.00 Anxiety     ICD-10-CM: F41.9 ICD-9-CM: 300.00 Essential hypertension     ICD-10-CM: I10 
ICD-9-CM: 401.9 Vitamin D deficiency     ICD-10-CM: E55.9 ICD-9-CM: 268.9 Diabetes mellitus due to underlying condition with hyperosmolarity without coma, unspecified whether long term insulin use (HCC)     ICD-10-CM: E08.00 ICD-9-CM: 249.20 Type 2 diabetes mellitus without complication, without long-term current use of insulin (HCC)     ICD-10-CM: E11.9 ICD-9-CM: 250.00 Screen for colon cancer     ICD-10-CM: Z12.11 ICD-9-CM: V76.51 Vitals BP Pulse Temp Resp Height(growth percentile) Weight(growth percentile) 152/87 (BP 1 Location: Right arm, BP Patient Position: Sitting) 70 98.1 °F (36.7 °C) (Oral) 17 6' 1\" (1.854 m) 257 lb (116.6 kg) SpO2 BMI Smoking Status 98% 33.91 kg/m2 Current Every Day Smoker BMI and BSA Data Body Mass Index Body Surface Area  
 33.91 kg/m 2 2.45 m 2 Preferred Pharmacy Pharmacy Name Phone Andres Rowley, [facility] [phone] Your Updated Medication List  
  
   
This list is accurate as of 6/8/18 12:33 PM.  Always use your most recent med list. ALLEGRA-D 24 HOUR PO Take  by mouth daily. Blood Glucose Strip-Disp Meter Kit 120 Strips by Does Not Apply route daily. Blood-Glucose Meter monitoring kit Check Blood Sugar once daily  
  
 clobetasol 0.05 % ointment Commonly known as:  TEMOVATE  
APPLY TO THE AFFECTED AREA TWICE DAILY  
  
 diazePAM 5 mg tablet Commonly known as:  VALIUM Take 1 Tab by mouth every twelve (12) hours as needed. EPINEPHrine 0.3 mg/0.3 mL injection Commonly known as:  EPIPEN  
0.3 mL by IntraMUSCular route once as needed. glucose blood VI test strips strip Commonly known as:  ASCENSIA AUTODISC VI, ONE TOUCH ULTRA TEST VI Check Blood Sugar Fasting Once a day  
  
 lancets 32 gauge Misc  
1 Package by Does Not Apply route daily. losartan 50 mg tablet Commonly known as:  COZAAR  
TAKE 1 TABLET DAILY  
  
 metFORMIN 500 mg tablet Commonly known as:  GLUCOPHAGE Take 1 Tab by mouth daily (with breakfast). olopatadine 0.1 % ophthalmic solution Commonly known as:  PATANOL Administer 1 Drop to both eyes two (2) times a day. simvastatin 10 mg tablet Commonly known as:  ZOCOR  
TAKE 1 TABLET NIGHTLY Prescriptions Printed Refills diazePAM (VALIUM) 5 mg tablet 0 Sig: Take 1 Tab by mouth every twelve (12) hours as needed. Class: Print Route: Oral  
  
Prescriptions Sent to Pharmacy Refills  
 simvastatin (ZOCOR) 10 mg tablet 1 Sig: TAKE 1 TABLET NIGHTLY Class: Normal  
 Pharmacy: [address], [address] [place] [address] Ph #: [phone]  
 losartan (COZAAR) 50 mg tablet 1 Sig: TAKE 1 TABLET DAILY Class: Normal  
 Pharmacy: [address] Ph #: [phone]  
 clobetasol (TEMOVATE) 0.05 % ointment 3 Sig: APPLY TO THE AFFECTED AREA TWICE DAILY Class: Normal  
 Pharmacy: [address] Ph #: [phone]  
 metFORMIN (GLUCOPHAGE) 500 mg tablet 1 Sig: Take 1 Tab by mouth daily (with breakfast). Class: Normal  
 Pharmacy: [address] [place] [address], [address] [place] [address] Ph #: [phone] Route: Oral  
  
We Performed the Following REFERRAL TO GASTROENTEROLOGY [HLZ92 Custom] Comments:  
 Please evaluate patient for screening colonoscopy. Follow-up Instructions Return for Follow up on blood pressures . To-Do List   
 06/08/2018 Lab:  HEMOGLOBIN A1C WITH EAG   
  
 06/08/2018 Lab:  MICROALBUMIN, UR, RAND W/ MICROALB/CREAT RATIO   
  
 06/08/2018 Lab:  PSA SCREENING (SCREENING) 06/08/2018 Lab:  VITAMIN D, 25 HYDROXY Around 09/06/2018 Lab:  CBC WITH AUTOMATED DIFF Around 09/06/2018 Lab:  LIPID PANEL Around 09/06/2018 Lab:  METABOLIC PANEL, COMPREHENSIVE Around 09/06/2018 Lab:  SED RATE (ESR) Referral Information Referral ID Referred By Referred To  
  
 [id number] CHE Bojorquez MD   
   [address] [place] [address] [address] [facility], [address] Phone: [phone] Fax: [phone] Visits Status Start Date End Date 1 New Request 6/8/18 6/8/19 If your referral has a status of pending review or denied, additional information will be sent to support the outcome of this decision. Patient Instructions Please contact our office if you have any questions about your visit today. 1.) Please keep a log of your blood pressures. The goal is to keep it below 140/90 regularly. 2.) Bring log back with you on next visit. We will determine if you need an adjustment on your BP meds. 3.) Please call GI for colonoscopy screening. Introducing [facility] & HEALTH SERVICES! [employer] introduces AkeLex patient portal. Now you can access parts of your medical record, email your doctor's office, and request medication refills online. 1. In your internet browser, go to [email or web address] 2. Click on the First Time User? Click Here link in the Sign In box. You will see the New Member Sign Up page. 3. Enter your AkeLex Access Code exactly as it appears below. You will not need to use this code after youve completed the sign-up process. If you do not sign up before the expiration date, you must request a new code. · AkeLex Access Code: [email or web address] Expires: 9/6/2018 12:33 PM 
 
4. Enter the last four digits of your Social Security Number (xxxx) and Date of Birth (mm/dd/yyyy) as indicated and click Submit. You will be taken to the next sign-up page. 5. Create a [employer] ID. This will be your AkeLex login ID and cannot be changed, so think of one that is secure and easy to remember. 6. Create a AkeLex password. You can change your password at any time. 7. Enter your Password Reset Question and Answer. This can be used at a later time if you forget your password. 8. Enter your e-mail address. You will receive e-mail notification when new information is available in [address] 9. Click Sign Up. You can now view and download portions of your medical record. 10. Click the Download Summary menu link to download a portable copy of your medical information. If you have questions, please visit the Frequently Asked Questions section of the iHeart website. Remember, iHeart is NOT to be used for urgent needs. For medical emergencies, dial 911. Now available from your iPhone and Android! Please provide this summary of care documentation to your next provider. Your primary care clinician is listed as Alonso Khoury. If you have any questions after today's visit, please call [phone].

## 2018-06-09 LAB — 25(OH)D3 SERPL-MCNC: 20.1 NG/ML (ref 30–100)

## 2018-06-11 LAB
CREAT UR-MCNC: 277.06 MG/DL (ref 30–125)
MICROALBUMIN UR-MCNC: 1.8 MG/DL (ref 0–3)
MICROALBUMIN/CREAT UR-RTO: 6 MG/G (ref 0–30)

## 2018-07-13 ENCOUNTER — OFFICE VISIT (OUTPATIENT)
Dept: FAMILY MEDICINE CLINIC | Age: 53
End: 2018-07-13

## 2018-07-13 VITALS
RESPIRATION RATE: 17 BRPM | DIASTOLIC BLOOD PRESSURE: 67 MMHG | BODY MASS INDEX: 33.93 KG/M2 | HEART RATE: 77 BPM | SYSTOLIC BLOOD PRESSURE: 113 MMHG | WEIGHT: 256 LBS | HEIGHT: 73 IN

## 2018-07-13 DIAGNOSIS — Z00.00 ANNUAL PHYSICAL EXAM: ICD-10-CM

## 2018-07-13 DIAGNOSIS — I10 ESSENTIAL HYPERTENSION: ICD-10-CM

## 2018-07-13 DIAGNOSIS — E78.5 HYPERLIPIDEMIA LDL GOAL <70: Primary | ICD-10-CM

## 2018-07-13 DIAGNOSIS — F41.9 ANXIETY: ICD-10-CM

## 2018-07-13 DIAGNOSIS — E66.9 DIABETES MELLITUS TYPE 2 IN OBESE (HCC): ICD-10-CM

## 2018-07-13 DIAGNOSIS — E55.9 VITAMIN D DEFICIENCY: ICD-10-CM

## 2018-07-13 DIAGNOSIS — E11.69 DIABETES MELLITUS TYPE 2 IN OBESE (HCC): ICD-10-CM

## 2018-07-13 DIAGNOSIS — E08.00 DIABETES MELLITUS DUE TO UNDERLYING CONDITION WITH HYPEROSMOLARITY WITHOUT COMA, UNSPECIFIED WHETHER LONG TERM INSULIN USE (HCC): ICD-10-CM

## 2018-07-13 RX ORDER — SIMVASTATIN 10 MG/1
TABLET, FILM COATED ORAL
Qty: 30 TAB | Refills: 6 | Status: SHIPPED | OUTPATIENT
Start: 2018-07-13 | End: 2018-07-13 | Stop reason: SDUPTHER

## 2018-07-13 RX ORDER — ERGOCALCIFEROL 1.25 MG/1
50000 CAPSULE ORAL
Qty: 12 CAP | Refills: 3 | Status: SHIPPED | OUTPATIENT
Start: 2018-07-13 | End: 2019-03-13 | Stop reason: SDUPTHER

## 2018-07-13 RX ORDER — LOSARTAN POTASSIUM 50 MG/1
TABLET ORAL
Qty: 90 TAB | Refills: 3 | Status: SHIPPED | OUTPATIENT
Start: 2018-07-13 | End: 2019-03-13 | Stop reason: SDUPTHER

## 2018-07-13 RX ORDER — SIMVASTATIN 10 MG/1
TABLET, FILM COATED ORAL
Qty: 90 TAB | Refills: 3 | Status: SHIPPED | OUTPATIENT
Start: 2018-07-13 | End: 2019-03-13 | Stop reason: SDUPTHER

## 2018-07-13 RX ORDER — ERGOCALCIFEROL 1.25 MG/1
50000 CAPSULE ORAL
Qty: 12 CAP | Refills: 6 | Status: SHIPPED | OUTPATIENT
Start: 2018-07-13 | End: 2018-07-13 | Stop reason: SDUPTHER

## 2018-07-13 RX ORDER — LOSARTAN POTASSIUM 50 MG/1
TABLET ORAL
Qty: 30 TAB | Refills: 6 | Status: SHIPPED | OUTPATIENT
Start: 2018-07-13 | End: 2018-07-13 | Stop reason: SDUPTHER

## 2018-07-13 RX ORDER — METFORMIN HYDROCHLORIDE 500 MG/1
500 TABLET ORAL
Qty: 30 TAB | Refills: 6 | Status: SHIPPED | OUTPATIENT
Start: 2018-07-13 | End: 2018-07-13 | Stop reason: SDUPTHER

## 2018-07-13 RX ORDER — METFORMIN HYDROCHLORIDE 500 MG/1
500 TABLET ORAL
Qty: 90 TAB | Refills: 3 | Status: SHIPPED | OUTPATIENT
Start: 2018-07-13 | End: 2019-03-13 | Stop reason: SDUPTHER

## 2018-07-13 NOTE — MR AVS SNAPSHOT
[address] [place] [address] 
 
 
 [address] [address] [address] [address] [phone] Patient: Sherry Castillo 
MRN: [id number] :1965 Visit Information Date & Time Provider Department Dept. Phone Encounter #  
 2018 11:00 AM [facility] [phone] [id number] Follow-up Instructions Return in about 6 months (around 2019) for Follow Up . Upcoming Health Maintenance Date Due  
 EYE EXAM RETINAL OR DILATED Q1 1977 Pneumococcal 19-64 Medium Risk (1 of 1 - PPSV23) 1986 FOOT EXAM Q1 12/3/2016 FOBT Q 1 YEAR AGE 50-75 2017 Influenza Age 5 to Adult 2018 HEMOGLOBIN A1C Q6M 2018 MICROALBUMIN Q1 2019 LIPID PANEL Q1 2019 DTaP/Tdap/Td series (2 - Td) 3/31/2025 Allergies as of 2018  Review Complete On: 2018 By: Ale Good DO Severity Noted Reaction Type Reactions Latex  2018    Hives Bee Pollen High 2018    Anaphylaxis Current Immunizations  Reviewed on 3/31/2015 Name Date Influenza Vaccine 10/30/2014 Tdap 3/31/2015 Not reviewed this visit You Were Diagnosed With   
  
 Codes Comments Hyperlipidemia LDL goal <70    -  Primary ICD-10-CM: E78.5 ICD-9-CM: 272.4 Annual physical exam     ICD-10-CM: Z00.00 ICD-9-CM: V70.0 Diabetes mellitus type 2 in [facility])     ICD-10-CM: E11.69, E66.9 ICD-9-CM: 250.00, 278.00 Anxiety     ICD-10-CM: F41.9 ICD-9-CM: 300.00 Essential hypertension     ICD-10-CM: I10 
ICD-9-CM: 401.9 Vitamin D deficiency     ICD-10-CM: E55.9 ICD-9-CM: 268.9 Diabetes mellitus due to underlying condition with hyperosmolarity without coma, unspecified whether long term insulin use (HCC)     ICD-10-CM: E08.00 ICD-9-CM: 249.20 Vitals BP Pulse Resp Height(growth percentile) Weight(growth percentile) BMI 113/67 (BP 1 Location: Left arm, BP Patient Position: Sitting) 77 17 6' 1\" (1.854 m) 256 lb (116.1 kg) 33.78 kg/m2 Smoking Status Current Every Day Smoker BMI and BSA Data Body Mass Index Body Surface Area 33.78 kg/m 2 2.45 m 2 Preferred Pharmacy Pharmacy Name Phone Andres Rowley, [facility] [phone] Your Updated Medication List  
  
   
This list is accurate as of 7/13/18 12:35 PM.  Always use your most recent med list. ALLEGRA-D 24 HOUR PO Take  by mouth daily. Blood Glucose Strip-Disp Meter Kit 120 Strips by Does Not Apply route daily. Blood-Glucose Meter monitoring kit Check Blood Sugar once daily  
  
 clobetasol 0.05 % ointment Commonly known as:  TEMOVATE  
APPLY TO THE AFFECTED AREA TWICE DAILY  
  
 diazePAM 5 mg tablet Commonly known as:  VALIUM Take 1 Tab by mouth every twelve (12) hours as needed. EPINEPHrine 0.3 mg/0.3 mL injection Commonly known as:  EPIPEN  
0.3 mL by IntraMUSCular route once as needed. ergocalciferol 50,000 unit capsule Commonly known as:  ERGOCALCIFEROL Take 1 Cap by mouth every seven (7) days. glucose blood VI test strips strip Commonly known as:  ASCENSIA AUTODISC VI, ONE TOUCH ULTRA TEST VI Check Blood Sugar Fasting Once a day  
  
 lancets 32 gauge Misc  
1 Package by Does Not Apply route daily. losartan 50 mg tablet Commonly known as:  COZAAR  
TAKE 1 TABLET DAILY  
  
 metFORMIN 500 mg tablet Commonly known as:  GLUCOPHAGE Take 1 Tab by mouth daily (with breakfast). olopatadine 0.1 % ophthalmic solution Commonly known as:  PATANOL Administer 1 Drop to both eyes two (2) times a day. simvastatin 10 mg tablet Commonly known as:  ZOCOR  
TAKE 1 TABLET NIGHTLY Prescriptions Printed  Refills  
 simvastatin (ZOCOR) 10 mg tablet 6  
 Sig: TAKE 1 TABLET NIGHTLY Class: Print  
 losartan (COZAAR) 50 mg tablet 6 Sig: TAKE 1 TABLET DAILY Class: Print  
 metFORMIN (GLUCOPHAGE) 500 mg tablet 6 Sig: Take 1 Tab by mouth daily (with breakfast). Class: Print Route: Oral  
 ergocalciferol (ERGOCALCIFEROL) 50,000 unit capsule 6 Sig: Take 1 Cap by mouth every seven (7) days. Class: Print Route: Oral  
  
Follow-up Instructions Return in about 6 months (around 1/13/2019) for Follow Up . To-Do List   
 07/13/2018 Lab:  HEMOGLOBIN A1C WITH EAG Around 10/11/2018 Lab:  LIPID PANEL Around 10/11/2018 Lab:  METABOLIC PANEL, COMPREHENSIVE Patient Instructions Please contact our office if you have any questions about your visit today. 1.) Please get labs 1 week before next appointment. 2.) Please call the GI doctor about getting the colonoscopy. 3.) Try and use emollients on the legs to help hydrate the legs (Aquaphor). 4.) Return in 6 months for follow up. Learning About ACE Inhibitors and ARBs for Diabetes Introduction ACE inhibitors and ARBs are medicines used to control blood pressure. They allow blood vessels to relax and open up. This lowers your blood pressure. When you have diabetes, taking an ACE inhibitor or ARB can help to: · Treat high blood pressure. Your risk of problems from diabetes goes up when you have high blood pressure. · Prevent or slow kidney damage. Diabetes can damage the blood vessels in the kidneys. High blood pressure can damage the kidneys, too. · Lower the risks of stroke and heart attack. Your risks go up when you have high blood pressure, heart disease, or both. An ACE inhibitor or ARB is a good choice for people with diabetes. Unlike some medicines, these don't affect blood sugar levels. Examples ACE inhibitors include: · Benazepril. · Lisinopril. · Ramipril. ARBs include: · Irbesartan. · Losartan. · Telmisartan. Possible side effects All medicines can cause side effects. Some side effects of ACE inhibitors include: 
· Low blood pressure. You may feel dizzy and weak. · A cough. · High potassium levels. · An allergic reaction of the skin. Symptoms may range from mild swelling to painful welts. Some side effects of ARBs include: · Diarrhea. · High potassium levels. · Sinus problems. · Stomach problems. You may have other side effects or reactions not listed here. Check the information that comes with your medicine. What to know about taking this medicine · Be safe with medicines. Take your medicines exactly as prescribed. Call your doctor if you think you are having a problem with your medicine. · Before starting an ACE inhibitor or ARB, tell your doctor if you: ¨ Use a salt substitute. ¨ Take diuretics or potassium tablets. · These medicines are not safe for pregnancy. If you are pregnant or planning to be, talk to your doctor about a safe blood pressure medicine. · ACE inhibitors can cause a dry cough. If the cough is bad, talk to your doctor. Switching to an ARB is likely to help. · Taking some medicines together can cause problems. Tell your doctor or pharmacist all the medicines you take. This includes over-the-counter medicines, vitamins, herbal products, and supplements. · You may need regular blood and urine tests. Where can you learn more? Go to http://kaye-tanvir.info/. Enter M316 in the search box to learn more about \"Learning About ACE Inhibitors and ARBs for Diabetes. \" Current as of: December 7, 2017 Content Version: 11.7 © 0563-7882 [employer]. Care instructions adapted under license by [employer] (which disclaims liability or warranty for this information).  If you have questions about a medical condition or this instruction, always ask your healthcare professional. [employer] disclaims any warranty or liability for your use of this information. Introducing [facility] & HEALTH SERVICES! Dear Nanci Conroy: Thank you for requesting a [employer] account. Our records indicate that you already have an active [employer] account. You can access your account anytime at [email or web address] Did you know that you can access your hospital and ER discharge instructions at any time in [employer]? You can also review all of your test results from your hospital stay or ER visit. Additional Information If you have questions, please visit the Frequently Asked Questions section of the [employer] website at [email or web address] Remember, [employer] is NOT to be used for urgent needs. For medical emergencies, dial 911. Now available from your iPhone and Android! Please provide this summary of care documentation to your next provider. Your primary care clinician is listed as [address] [place] [address]. If you have any questions after today's visit, please call [phone].

## 2018-07-13 NOTE — PROGRESS NOTES
.         Progress Note    Patient: Miri Eugene MRN: [id number]  SSN: [id number]    YOB: 1965  Age: 50 y.o. Sex: male          Subjective:   Miri Eugene is a 50 y.o. male who is here for follow up. The patient admits to not getting enough sleep. He is also interested in getting his lab results back as well. Visit from 6/8/2018  Patient is here for an annual physical. The patient mentions that he got a new job at [employer]. He mentions that this new position is less stressful for him. He mentions that he checks his BP at home. He does get numbers in the 140 range. He also asks for refills on his maintenance medications. Visit from 3/31/2017   He states that the work environment. 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 He states that his new position also very expansive. He saw his dermatologist a few weeks ago and they did a biopsy under his chin and behind his ear. In regard to his diabetes he has been watching his diet and trying to cut back on cheese. He states that he has also been increasing his vegetable intake. He states that he is not getting the same amount of exercise that he did on his previous position. He states that at his job he has a gym and rarely goes to it. On occasion he will walk on the treadmill at the gym at work. Patient denies any more tremors in his hand. Objective:     Past Medical History:   Diagnosis Date    Diabetes ([address]     Eczema     History of seasonal allergies       Visit Vitals    /67 (BP 1 Location: Left arm, BP Patient Position: Sitting)    Pulse 77    Resp 17    Ht 6' 1\" (1.854 m)    Wt 256 lb (116.1 kg)    BMI 33.78 kg/m2       Review of Systems:  Pertinent ROS noted in HPI     Physical Exam:   GENERAL: alert, cooperative, no distress, appears stated age, moderately obese  EYE: conjunctivae/corneas clear. PERRL, EOM's intact.  Fundi benign  LYMPHATIC: Cervical, supraclavicular, and axillary nodes normal.   THROAT & NECK: normal and no erythema or exudates noted. LUNG: clear to auscultation bilaterally  HEART: regular rate and rhythm, S1, S2 normal, no murmur, click, rub or gallop  ABDOMEN: soft, non-tender. Bowel sounds normal. No masses,  no organomegaly. EXTREMITIES:  No edema or cyanosis   SKIN: Eczematous patches noted on face and dorsal aspects of hands. Lab/Data Review:    Lab Results   Component Value Date/Time    Hemoglobin A1c 7.1 (H) 06/08/2018 02:10 PM    Hemoglobin A1c (POC) 6.6 09/29/2011 10:22 AM     Lab Results   Component Value Date/Time    Vitamin D 25-Hydroxy 20.1 (L) 06/08/2018 02:10 PM         Lab Results   Component Value Date/Time    WBC 8.3 06/08/2018 02:10 PM    HGB 14.4 06/08/2018 02:10 PM    HCT 44.4 06/08/2018 02:10 PM    PLATELET 503 52/08/6115 02:10 PM    MCV 96.9 06/08/2018 02:10 PM       Lab Results   Component Value Date/Time    Sodium 143 06/08/2018 02:10 PM    Potassium 4.1 06/08/2018 02:10 PM    Chloride 104 06/08/2018 02:10 PM    CO2 29 06/08/2018 02:10 PM    Anion gap 10 06/08/2018 02:10 PM    Glucose 118 (H) 06/08/2018 02:10 PM    BUN 9 06/08/2018 02:10 PM    Creatinine 1.06 06/08/2018 02:10 PM    BUN/Creatinine ratio 8 (L) 06/08/2018 02:10 PM    GFR est AA >60 06/08/2018 02:10 PM    GFR est non-AA >60 06/08/2018 02:10 PM    Calcium 9.4 06/08/2018 02:10 PM    Bilirubin, total 0.7 06/08/2018 02:10 PM    AST (SGOT) 21 06/08/2018 02:10 PM    Alk.  phosphatase 73 06/08/2018 02:10 PM    Protein, total 6.9 06/08/2018 02:10 PM    Albumin 4.1 06/08/2018 02:10 PM    Globulin 2.8 06/08/2018 02:10 PM    A-G Ratio 1.5 06/08/2018 02:10 PM    ALT (SGPT) 37 06/08/2018 02:10 PM     Lab Results   Component Value Date/Time    Cholesterol, total 183 06/08/2018 02:10 PM    HDL Cholesterol 66 (H) 06/08/2018 02:10 PM    LDL, calculated 94.8 06/08/2018 02:10 PM    VLDL, calculated 22.2 06/08/2018 02:10 PM    Triglyceride 111 06/08/2018 02:10 PM CHOL/HDL Ratio 2.8 06/08/2018 02:10 PM     Lab Results   Component Value Date/Time    Prostate Specific Ag 0.8 06/08/2018 02:10 PM    Prostate Specific Ag 0.8 12/14/2009 09:28 AM           Assessment:     1.) Hypercholesterolemia: Lipid panel reviewed in the office today. Patient will continue on Simvastatin 10 mg daily. 2.) Hypertension:  Patient was advised to log his blood pressure readings. Patient will continue on Losartan which was reordered. If readings are still high then I will increase his losartan to 100 mg.      3.) Diabetes Mellitus Type 2: Patient was reordered metformin but he did note the fact that he has not been on the medication since last year. HgbA1C drawn in the office today. He will continue lifestyle change. 4.) Vitamin D Deficiency: Patient will use 50,000 units per week. 5.) Eczema: Patient will continue on Temovate. He will continue to follow with Dermatology. 6.) Intermittent Anxiety: Patient stable on Valium as needed. 7.) Preventive Screenings: Labs were reviewed with the patient. Patient previously referred to GI for screening colonoscopy. PSA ordered. Patient will return in 6 months for follow up.      Plan:     Orders Placed This Encounter    HEMOGLOBIN A1C WITH EAG    METABOLIC PANEL, COMPREHENSIVE    LIPID PANEL    DISCONTD: simvastatin (ZOCOR) 10 mg tablet    DISCONTD: losartan (COZAAR) 50 mg tablet    DISCONTD: metFORMIN (GLUCOPHAGE) 500 mg tablet    DISCONTD: ergocalciferol (ERGOCALCIFEROL) 50,000 unit capsule    simvastatin (ZOCOR) 10 mg tablet    losartan (COZAAR) 50 mg tablet    metFORMIN (GLUCOPHAGE) 500 mg tablet    ergocalciferol (ERGOCALCIFEROL) 50,000 unit capsule             Signed By: Fozia Matamoros DO     July 13, 2018

## 2018-07-13 NOTE — PROGRESS NOTES
Chief Complaint   Patient presents with    Hypertension    Diabetes     has seen his labs     1. Have you been to the ER, urgent care clinic since your last visit? Hospitalized since your last visit? No    2. Have you seen or consulted any other health care providers outside of the [address] [place] [address] since your last visit? Include any pap smears or colon screening.  No

## 2018-07-13 NOTE — PATIENT INSTRUCTIONS
Please contact our office if you have any questions about your visit today. 1.) Please get labs 1 week before next appointment. 2.) Please call the GI doctor about getting the colonoscopy. 3.) Try and use emollients on the legs to help hydrate the legs (Aquaphor). 4.) Return in 6 months for follow up. Learning About ACE Inhibitors and ARBs for Diabetes  Introduction    ACE inhibitors and ARBs are medicines used to control blood pressure. They allow blood vessels to relax and open up. This lowers your blood pressure. When you have diabetes, taking an ACE inhibitor or ARB can help to:  · Treat high blood pressure. Your risk of problems from diabetes goes up when you have high blood pressure. · Prevent or slow kidney damage. Diabetes can damage the blood vessels in the kidneys. High blood pressure can damage the kidneys, too. · Lower the risks of stroke and heart attack. Your risks go up when you have high blood pressure, heart disease, or both. An ACE inhibitor or ARB is a good choice for people with diabetes. Unlike some medicines, these don't affect blood sugar levels. Examples  ACE inhibitors include:  · Benazepril. · Lisinopril. · Ramipril. ARBs include:  · Irbesartan. · Losartan. · Telmisartan. Possible side effects  All medicines can cause side effects. Some side effects of ACE inhibitors include:  · Low blood pressure. You may feel dizzy and weak. · A cough. · High potassium levels. · An allergic reaction of the skin. Symptoms may range from mild swelling to painful welts. Some side effects of ARBs include:  · Diarrhea. · High potassium levels. · Sinus problems. · Stomach problems. You may have other side effects or reactions not listed here. Check the information that comes with your medicine. What to know about taking this medicine  · Be safe with medicines. Take your medicines exactly as prescribed.  Call your doctor if you think you are having a problem with your medicine. · Before starting an ACE inhibitor or ARB, tell your doctor if you:  ¨ Use a salt substitute. ¨ Take diuretics or potassium tablets. · These medicines are not safe for pregnancy. If you are pregnant or planning to be, talk to your doctor about a safe blood pressure medicine. · ACE inhibitors can cause a dry cough. If the cough is bad, talk to your doctor. Switching to an ARB is likely to help. · Taking some medicines together can cause problems. Tell your doctor or pharmacist all the medicines you take. This includes over-the-counter medicines, vitamins, herbal products, and supplements. · You may need regular blood and urine tests. Where can you learn more? Go to http://kaye-tanvir.info/. Enter M316 in the search box to learn more about \"Learning About ACE Inhibitors and ARBs for Diabetes. \"  Current as of: December 7, 2017  Content Version: 11.7  © 2044-5442 [employer]. Care instructions adapted under license by [employer] (which disclaims liability or warranty for this information). If you have questions about a medical condition or this instruction, always ask your healthcare professional. [place] [address] any warranty or liability for your use of this information.

## 2019-03-13 ENCOUNTER — TELEPHONE (OUTPATIENT)
Dept: INTERNAL MEDICINE CLINIC | Age: 54
End: 2019-03-13

## 2019-03-13 ENCOUNTER — HOSPITAL ENCOUNTER (OUTPATIENT)
Dept: LAB | Age: 54
Discharge: HOME | End: 2019-03-13
Payer: COMMERCIAL

## 2019-03-13 ENCOUNTER — OFFICE VISIT (OUTPATIENT)
Dept: INTERNAL MEDICINE CLINIC | Age: 54
End: 2019-03-13

## 2019-03-13 VITALS
OXYGEN SATURATION: 98 % | HEART RATE: 88 BPM | TEMPERATURE: 98.4 F | DIASTOLIC BLOOD PRESSURE: 81 MMHG | BODY MASS INDEX: 34.19 KG/M2 | SYSTOLIC BLOOD PRESSURE: 134 MMHG | WEIGHT: 258 LBS | HEIGHT: 73 IN | RESPIRATION RATE: 14 BRPM

## 2019-03-13 DIAGNOSIS — I10 ESSENTIAL HYPERTENSION: ICD-10-CM

## 2019-03-13 DIAGNOSIS — E08.00 DIABETES MELLITUS DUE TO UNDERLYING CONDITION WITH HYPEROSMOLARITY WITHOUT COMA, UNSPECIFIED WHETHER LONG TERM INSULIN USE (HCC): ICD-10-CM

## 2019-03-13 DIAGNOSIS — F41.9 ANXIETY: ICD-10-CM

## 2019-03-13 DIAGNOSIS — Z12.11 SCREEN FOR COLON CANCER: ICD-10-CM

## 2019-03-13 DIAGNOSIS — E78.5 HYPERLIPIDEMIA LDL GOAL <70: ICD-10-CM

## 2019-03-13 DIAGNOSIS — Z00.00 ANNUAL PHYSICAL EXAM: Primary | ICD-10-CM

## 2019-03-13 DIAGNOSIS — T63.441A ANAPHYLACTIC REACTION TO BEE STING: ICD-10-CM

## 2019-03-13 DIAGNOSIS — R06.89 CO2 NARCOSIS: ICD-10-CM

## 2019-03-13 DIAGNOSIS — E66.9 DIABETES MELLITUS TYPE 2 IN OBESE (HCC): ICD-10-CM

## 2019-03-13 DIAGNOSIS — E11.69 DIABETES MELLITUS TYPE 2 IN OBESE (HCC): ICD-10-CM

## 2019-03-13 DIAGNOSIS — Z00.00 ANNUAL PHYSICAL EXAM: ICD-10-CM

## 2019-03-13 DIAGNOSIS — J30.89 ENVIRONMENTAL AND SEASONAL ALLERGIES: ICD-10-CM

## 2019-03-13 DIAGNOSIS — E55.9 VITAMIN D DEFICIENCY: ICD-10-CM

## 2019-03-13 LAB
ALBUMIN SERPL-MCNC: 4.2 G/DL (ref 3.4–5)
ALBUMIN/GLOB SERPL: 1.4 {RATIO} (ref 0.8–1.7)
ALP SERPL-CCNC: 73 U/L (ref 45–117)
ALT SERPL-CCNC: 44 U/L (ref 16–61)
ANION GAP SERPL CALC-SCNC: 6 MMOL/L (ref 3–18)
AST SERPL-CCNC: 26 U/L (ref 15–37)
BASOPHILS # BLD: 0 K/UL (ref 0–0.1)
BASOPHILS NFR BLD: 0 % (ref 0–2)
BILIRUB SERPL-MCNC: 0.5 MG/DL (ref 0.2–1)
BUN SERPL-MCNC: 10 MG/DL (ref 7–18)
BUN/CREAT SERPL: 10 (ref 12–20)
CALCIUM SERPL-MCNC: 8.7 MG/DL (ref 8.5–10.1)
CHLORIDE SERPL-SCNC: 105 MMOL/L (ref 100–108)
CHOLEST SERPL-MCNC: 145 MG/DL
CO2 SERPL-SCNC: 28 MMOL/L (ref 21–32)
CO2 SERPL-SCNC: 29 MMOL/L (ref 21–32)
CREAT SERPL-MCNC: 0.96 MG/DL (ref 0.6–1.3)
DIFFERENTIAL METHOD BLD: ABNORMAL
EOSINOPHIL # BLD: 0.1 K/UL (ref 0–0.4)
EOSINOPHIL NFR BLD: 1 % (ref 0–5)
ERYTHROCYTE [DISTWIDTH] IN BLOOD BY AUTOMATED COUNT: 12.5 % (ref 11.6–14.5)
GLOBULIN SER CALC-MCNC: 2.9 G/DL (ref 2–4)
GLUCOSE SERPL-MCNC: 133 MG/DL (ref 74–99)
HBA1C MFR BLD: 7.5 % (ref 4.2–5.6)
HCT VFR BLD AUTO: 46 % (ref 36–48)
HDLC SERPL-MCNC: 58 MG/DL (ref 40–60)
HDLC SERPL: 2.5 {RATIO} (ref 0–5)
HGB BLD-MCNC: 14.7 G/DL (ref 13–16)
LDLC SERPL CALC-MCNC: 72.6 MG/DL (ref 0–100)
LIPID PROFILE,FLP: NORMAL
LYMPHOCYTES # BLD: 2.5 K/UL (ref 0.9–3.6)
LYMPHOCYTES NFR BLD: 26 % (ref 21–52)
MCH RBC QN AUTO: 30.6 PG (ref 24–34)
MCHC RBC AUTO-ENTMCNC: 32 G/DL (ref 31–37)
MCV RBC AUTO: 95.6 FL (ref 74–97)
MONOCYTES # BLD: 0.6 K/UL (ref 0.05–1.2)
MONOCYTES NFR BLD: 6 % (ref 3–10)
NEUTS SEG # BLD: 6.5 K/UL (ref 1.8–8)
NEUTS SEG NFR BLD: 67 % (ref 40–73)
PLATELET # BLD AUTO: 189 K/UL (ref 135–420)
PMV BLD AUTO: 8.9 FL (ref 9.2–11.8)
POTASSIUM SERPL-SCNC: 4.1 MMOL/L (ref 3.5–5.5)
PROT SERPL-MCNC: 7.1 G/DL (ref 6.4–8.2)
RBC # BLD AUTO: 4.81 M/UL (ref 4.7–5.5)
SODIUM SERPL-SCNC: 139 MMOL/L (ref 136–145)
T4 FREE SERPL-MCNC: 1.1 NG/DL (ref 0.7–1.5)
TRIGL SERPL-MCNC: 72 MG/DL (ref ?–150)
TSH SERPL DL<=0.05 MIU/L-ACNC: 0.91 UIU/ML (ref 0.36–3.74)
VLDLC SERPL CALC-MCNC: 14.4 MG/DL
WBC # BLD AUTO: 9.7 K/UL (ref 4.6–13.2)

## 2019-03-13 PROCEDURE — 84443 ASSAY THYROID STIM HORMONE: CPT

## 2019-03-13 PROCEDURE — 82043 UR ALBUMIN QUANTITATIVE: CPT

## 2019-03-13 PROCEDURE — 83036 HEMOGLOBIN GLYCOSYLATED A1C: CPT

## 2019-03-13 PROCEDURE — 80053 COMPREHEN METABOLIC PANEL: CPT

## 2019-03-13 PROCEDURE — 82374 ASSAY BLOOD CARBON DIOXIDE: CPT

## 2019-03-13 PROCEDURE — 84439 ASSAY OF FREE THYROXINE: CPT

## 2019-03-13 PROCEDURE — 85025 COMPLETE CBC W/AUTO DIFF WBC: CPT

## 2019-03-13 PROCEDURE — 80061 LIPID PANEL: CPT

## 2019-03-13 RX ORDER — METFORMIN HYDROCHLORIDE 500 MG/1
500 TABLET ORAL
Qty: 90 TAB | Refills: 3 | Status: SHIPPED | OUTPATIENT
Start: 2019-03-13 | End: 2019-03-15

## 2019-03-13 RX ORDER — DIAZEPAM 5 MG/1
5 TABLET ORAL
Qty: 60 TAB | Refills: 0 | Status: SHIPPED | OUTPATIENT
Start: 2019-03-13 | End: 2020-04-24

## 2019-03-13 RX ORDER — MOMETASONE FUROATE 50 UG/1
2 SPRAY, METERED NASAL
Qty: 1 CONTAINER | Refills: 3 | Status: SHIPPED | OUTPATIENT
Start: 2019-03-13 | End: 2020-04-24

## 2019-03-13 RX ORDER — EPINEPHRINE 0.3 MG/.3ML
0.3 INJECTION SUBCUTANEOUS
Qty: 0.6 ML | Refills: 0 | Status: SHIPPED | OUTPATIENT
Start: 2019-03-13 | End: 2019-03-13

## 2019-03-13 RX ORDER — ERGOCALCIFEROL 1.25 MG/1
50000 CAPSULE ORAL
Qty: 12 CAP | Refills: 3 | Status: SHIPPED | OUTPATIENT
Start: 2019-03-13 | End: 2020-05-15 | Stop reason: SDUPTHER

## 2019-03-13 RX ORDER — MOMETASONE FUROATE 50 UG/1
2 SPRAY, METERED NASAL
COMMUNITY
End: 2019-03-13 | Stop reason: SDUPTHER

## 2019-03-13 RX ORDER — FEXOFENADINE HCL AND PSEUDOEPHEDRINE HCI 180; 240 MG/1; MG/1
TABLET, EXTENDED RELEASE ORAL AS NEEDED
Status: CANCELLED | OUTPATIENT
Start: 2019-03-13

## 2019-03-13 RX ORDER — LOSARTAN POTASSIUM 50 MG/1
TABLET ORAL
Qty: 90 TAB | Refills: 3 | Status: SHIPPED | OUTPATIENT
Start: 2019-03-13 | End: 2019-11-21 | Stop reason: SDUPTHER

## 2019-03-13 RX ORDER — SIMVASTATIN 10 MG/1
TABLET, FILM COATED ORAL
Qty: 90 TAB | Refills: 3 | Status: SHIPPED | OUTPATIENT
Start: 2019-03-13 | End: 2019-11-21 | Stop reason: SDUPTHER

## 2019-03-13 RX ORDER — HYDROXYZINE HYDROCHLORIDE 10 MG/1
10 TABLET, FILM COATED ORAL
Qty: 90 TAB | Refills: 3 | Status: SHIPPED | OUTPATIENT
Start: 2019-03-13 | End: 2019-04-04 | Stop reason: SDUPTHER

## 2019-03-13 NOTE — PROGRESS NOTES
HPI     Kaiser Gabriel is a 48 y.o. male with relevant past medical history of anxiety disorder, bipolar disorder, HTN, HLD, DM2, obesity, varicose veins, seasonal allergies. Here to establish care as previous PCP left the practice. Feels well overall. Shows me labs from 12/2018 from work medical office. LDL 78, Tchol 178, HDL 51, . HbA1c 6.9. Normal renal function. No CBC available. Reports compliance with all meds. Reports needing refills on all medications. When asked about valium prescription, explains to me that he only uses limited times throughout the month, perhaps once or twice. A prescription can last him over 6 months to a year. He denies any recent panic attacks. Reports history of bipolar disorder, with admission for jori, many years ago, and having to be on medication for mood disorders for some time, but not for many years. Feels that he is doing okay and denies any psychosis, depression symptoms, including SI. Does admit to increased stress at work, and with his mother's health, but overall believes he is coping well. He does report concern for CO2 in work building and he says he experiences occipital headaches, malaise, fatigue and dizziness at times only at work that improve after he goes home. He works as an . Smokes cigarretes. Denies any h/o alcohol or illicit drug use. Mother has h/o anemia. ROS  As above included in HPI. Otherwise 11 point review of systems negative including constitutional, skin, HENT, eyes, respiratory, cardiovascular, gastrointestinal, genitourinary, musculoskeletal, endocrine, hematologic, allergy, and neurologic. Past Medical History  Past Medical History:   Diagnosis Date    Diabetes ([address]     Eczema     History of seasonal allergies     Hypertension     Vitamin D deficiency      History reviewed. No pertinent surgical history.      Family History  Family History   Adopted: Yes   Family history unknown: Yes       Social History  He reports that he has been smoking cigarettes. He has smoked for the past 15.00 years. he has never used smokeless tobacco.   Social History     Substance and Sexual Activity   Alcohol Use Yes    Alcohol/week: 2.0 oz    Types: 4 Cans of beer per week    Comment: moderate       Immunization History  Immunization History   Administered Date(s) Administered    Influenza Vaccine 10/30/2014    Tdap 03/31/2015       Allergies  Allergies   Allergen Reactions    Latex Hives    Bee Pollen Anaphylaxis       Medications  Current Outpatient Medications   Medication Sig    simvastatin (ZOCOR) 10 mg tablet TAKE 1 TABLET NIGHTLY    losartan (COZAAR) 50 mg tablet TAKE 1 TABLET DAILY    metFORMIN (GLUCOPHAGE) 500 mg tablet Take 1 Tab by mouth daily (with breakfast).  ergocalciferol (ERGOCALCIFEROL) 50,000 unit capsule Take 1 Cap by mouth every seven (7) days.  mometasone (NASONEX) 50 mcg/actuation nasal spray 2 Sprays by Both Nostrils route daily as needed for Cough.  EPINEPHrine (EPIPEN) 0.3 mg/0.3 mL injection 0.3 mL by IntraMUSCular route once as needed for Anaphylaxis for up to 1 dose.  diazePAM (VALIUM) 5 mg tablet Take 1 Tab by mouth every twelve (12) hours as needed for Anxiety.  hydrOXYzine HCl (ATARAX) 10 mg tablet Take 1 Tab by mouth three (3) times daily as needed for Itching (allergy medication) for up to 10 days. May cause drowsiness, preferably at bedtime    clobetasol (TEMOVATE) 0.05 % ointment APPLY TO THE AFFECTED AREA TWICE DAILY    glucose blood VI test strips (ASCENSIA AUTODISC VI, ONE TOUCH ULTRA TEST VI) strip Check Blood Sugar Fasting Once a day    BLOOD GLUCOSE STRIPS-DISPMETER (BLOOD GLUCOSE STRIP-DISP METER) kit 120 Strips by Does Not Apply route daily.  olopatadine (PATANOL) 0.1 % ophthalmic solution Administer 1 Drop to both eyes two (2) times a day.     Blood-Glucose Meter monitoring kit Check Blood Sugar once daily    lancets 32 gauge misc 1 Package by Does Not Apply route daily.    P-EPHED HCL/FEXOFENADINE HCL (ALLEGRA-D 24 HOUR PO) Take  by mouth as needed. No current facility-administered medications for this visit. Visit Vitals  /81 (BP 1 Location: Left arm, BP Patient Position: Sitting)   Pulse 88   Temp 98.4 °F (36.9 °C) (Oral)   Resp 14   Ht 6' 1\" (1.854 m)   Wt 258 lb (117 kg)   SpO2 98%   BMI 34.04 kg/m²     Body mass index is 34.04 kg/m². Physical Exam   Constitutional: He is oriented to person, place, and time and well-developed, well-nourished, and in no distress. HENT:   Head: Normocephalic and atraumatic. Eyes: Conjunctivae are normal. Pupils are equal, round, and reactive to light. Neck: Normal range of motion. Neck supple. Cardiovascular: Normal rate and regular rhythm. Pulmonary/Chest: Effort normal and breath sounds normal.   Abdominal: Soft. Bowel sounds are normal.   Musculoskeletal: He exhibits edema. Trace edema on both LE, varicose veins and ruptured capilaries noticed on both legs, R>L   Neurological: He is alert and oriented to person, place, and time. Skin: Skin is warm. Psychiatric: Mood and affect normal.   Nursing note and vitals reviewed. REVIEW OF DATA    Labs  No visits with results within 1 Month(s) from this visit.    Latest known visit with results is:   Hospital Outpatient Visit on 06/08/2018   Component Date Value Ref Range Status    LIPID PROFILE 06/08/2018        Final    Cholesterol, total 06/08/2018 183  <200 MG/DL Final    Triglyceride 06/08/2018 111  <150 MG/DL Final    HDL Cholesterol 06/08/2018 66* 40 - 60 MG/DL Final    LDL, calculated 06/08/2018 94.8  0 - 100 MG/DL Final    VLDL, calculated 06/08/2018 22.2  MG/DL Final    CHOL/HDL Ratio 06/08/2018 2.8  0 - 5.0   Final    Vitamin D 25-Hydroxy 06/08/2018 20.1* 30 - 100 ng/mL Final    Hemoglobin A1c 06/08/2018 7.1* 4.2 - 5.6 % Final    Est. average glucose 06/08/2018 157  mg/dL Final    Sodium 06/08/2018 143  136 - 145 mmol/L Final    Potassium 06/08/2018 4.1  3.5 - 5.5 mmol/L Final    Chloride 06/08/2018 104  100 - 108 mmol/L Final    CO2 06/08/2018 29  21 - 32 mmol/L Final    Anion gap 06/08/2018 10  3.0 - 18 mmol/L Final    Glucose 06/08/2018 118* 74 - 99 mg/dL Final    BUN 06/08/2018 9  7.0 - 18 MG/DL Final    Creatinine 06/08/2018 1.06  0.6 - 1.3 MG/DL Final    BUN/Creatinine ratio 06/08/2018 8* 12 - 20   Final    GFR est AA 06/08/2018 >60  >60 ml/min/1.73m2 Final    GFR est non-AA 06/08/2018 >60  >60 ml/min/1.73m2 Final    Calcium 06/08/2018 9.4  8.5 - 10.1 MG/DL Final    Bilirubin, total 06/08/2018 0.7  0.2 - 1.0 MG/DL Final    ALT (SGPT) 06/08/2018 37  16 - 61 U/L Final    AST (SGOT) 06/08/2018 21  15 - 37 U/L Final    Alk. phosphatase 06/08/2018 73  45 - 117 U/L Final    Protein, total 06/08/2018 6.9  6.4 - 8.2 g/dL Final    Albumin 06/08/2018 4.1  3.4 - 5.0 g/dL Final    Globulin 06/08/2018 2.8  2.0 - 4.0 g/dL Final    A-G Ratio 06/08/2018 1.5  0.8 - 1.7   Final    WBC 06/08/2018 8.3  4.6 - 13.2 K/uL Final    RBC 06/08/2018 4.58* 4.70 - 5.50 M/uL Final    HGB 06/08/2018 14.4  13.0 - 16.0 g/dL Final    HCT 06/08/2018 44.4  36.0 - 48.0 % Final    MCV 06/08/2018 96.9  74.0 - 97.0 FL Final    MCH 06/08/2018 31.4  24.0 - 34.0 PG Final    MCHC 06/08/2018 32.4  31.0 - 37.0 g/dL Final    RDW 06/08/2018 13.1  11.6 - 14.5 % Final    PLATELET 93/65/1522 651  135 - 420 K/uL Final    MPV 06/08/2018 8.8* 9.2 - 11.8 FL Final    NEUTROPHILS 06/08/2018 65  40 - 73 % Final    LYMPHOCYTES 06/08/2018 26  21 - 52 % Final    MONOCYTES 06/08/2018 8  3 - 10 % Final    EOSINOPHILS 06/08/2018 1  0 - 5 % Final    BASOPHILS 06/08/2018 0  0 - 2 % Final    ABS. NEUTROPHILS 06/08/2018 5.4  1.8 - 8.0 K/UL Final    ABS. LYMPHOCYTES 06/08/2018 2.1  0.9 - 3.6 K/UL Final    ABS. MONOCYTES 06/08/2018 0.7  0.05 - 1.2 K/UL Final    ABS. EOSINOPHILS 06/08/2018 0.1  0.0 - 0.4 K/UL Final    ABS.  BASOPHILS 06/08/2018 0.0  0.0 - 0.06 K/UL Final    DF 06/08/2018 AUTOMATED    Final    Sed rate, automated 06/08/2018 2  0 - 15 mm/hr Final    Prostate Specific Ag 06/08/2018 0.8  0.0 - 4.0 ng/mL Final    Microalbumin,urine random 06/08/2018 1.80  0 - 3.0 MG/DL Final    Creatinine, urine 06/08/2018 277.06* 30 - 125 mg/dL Final    Microalbumin/Creat ratio (mg/g cre* 06/08/2018 6  0 - 30 mg/g Final         CT Results (most recent):  No results found for this or any previous visit. XR Results (most recent):  No results found for this or any previous visit. CT   All Micro Results     None              DIAGNOSIS AND PLAN  Patient Active Problem List   Diagnosis Code    Hyperlipidemia E78.5    Eczema of hand L30.9    Elevated blood pressure [id number]    Low back pain M54.5    Overweight(278.02) E66.3    Diabetes mellitus type 2 in obese (HCC) E11.69, E66.9    Vitamin D deficiency E55.9    Hypertension I10    Anxiety F41.9    Varicose veins of lower extremities with other complications L33.291     1. Annual physical exam  Physical exam done. Screening labs as ordered. Referral to GI for colonoscopy. 2. Hyperlipidemia     Lipids well controlled. LDL 78 and tochol 178 on 12/2018. Will monitor  - simvastatin (ZOCOR) 10 mg tablet; TAKE 1 TABLET NIGHTLY  Dispense: 90 Tab; Refill: 3     3. Diabetes mellitus type 2 in obese (HCC)  C/w metformin daily 500 mg  Will monitor HbA1c    4. Essential hypertension  Controlled with current regimen losartan 50 mg daily. Weight loss recommended. 5. Anxiety  Patient reports history of bipolar disorder, requiring hospitalization in [facility] [place] for a few weeks for jori. Used to be on different medication combinations, currently off, except for valium that he takes only a few times a month if any for severe anxiety, usually related to work. He denies any symptoms concerning for depression, psychosis or jori at this time. He does seem anxious in general about stress from work.  Offered trial of venlafaxine, patient would like to research this medication first and think aobut it. - Valium to use only as needed for severe anxiety, wean off as  Much as possible  - Trial of hydroxyzine    6. Diabetes mellitus due to underlying condition with hyperosmolarity without coma, unspecified whether long term insulin use (HCC)  Monitor HbA1c, last well controll 6.9 on 12/2018 at work. - metFORMIN (GLUCOPHAGE) 500 mg tablet; Take 1 Tab by mouth daily (with breakfast). Dispense: 90 Tab; Refill: 3  - MICROALBUMIN, UR, RAND W/ MICROALB/CREAT RATIO; Future  - HEMOGLOBIN A1C W/O EAG; Future    7. Vitamin D deficiency  Will monitor levels. - ergocalciferol (ERGOCALCIFEROL) 50,000 unit capsule; Take 1 Cap by mouth every seven (7) days. Dispense: 12 Cap; Refill: 3    8. Anaphylactic reaction to bee sting  - EPINEPHrine (EPIPEN) 0.3 mg/0.3 mL injection; 0.3 mL by IntraMUSCular route once as needed for Anaphylaxis for up to 1 dose. Dispense: 0.6 mL; Refill: 0    9. Screen for colon cancer  - REFERRAL TO GASTROENTEROLOGY    10. CO2 narcosis  Reports headache, malaise, difficulty breathing at work, improves after he leaves the building. Concerned about CO2 in the building, would like to be screened. I mentioned to him that if study is normal it could possibly associated to anxiety at work, as all symptoms resolve after he leaves. - CO2; Future  - CBC WITH AUTOMATED DIFF; Future    11. Environmental and seasonal allergies  Recommended to stop allegra as he reports anxiety with pseudoephedrine combinations. Trial of atarax as it may help with congestion, allergies, pruritus and anxiety  Continue with nasonex, refilled. Discuss consideration for singulair during spring time to decrease worsening symptoms, but the patient prefers to hold off on it for now. Follow-up Disposition:  Return in about 4 months (around 7/13/2019). Kriss Araiza MD

## 2019-03-14 LAB
CREAT UR-MCNC: 238 MG/DL (ref 30–125)
MICROALBUMIN UR-MCNC: 2.22 MG/DL (ref 0–3)
MICROALBUMIN/CREAT UR-RTO: 9 MG/G (ref 0–30)

## 2019-03-15 DIAGNOSIS — E08.00 DIABETES MELLITUS DUE TO UNDERLYING CONDITION WITH HYPEROSMOLARITY WITHOUT COMA, UNSPECIFIED WHETHER LONG TERM INSULIN USE (HCC): ICD-10-CM

## 2019-03-15 RX ORDER — METFORMIN HYDROCHLORIDE 500 MG/1
500 TABLET ORAL 2 TIMES DAILY WITH MEALS
Qty: 180 TAB | Refills: 3 | Status: SHIPPED | OUTPATIENT
Start: 2019-03-15 | End: 2019-11-21 | Stop reason: SDUPTHER

## 2019-03-15 NOTE — PROGRESS NOTES
HbA1c 7.5 on recent labs. Discussed results with patient over the phone. Recommended to increase metformin to 500 mg BID from daily. Med modification done and sent to pharmacy on file.      Ray Moreno MD

## 2019-04-03 NOTE — TELEPHONE ENCOUNTER
Pt is calling to speak with nurse about his medication that he doesn't see on his med list, Also he was referred for colonoscopy and has not been scheduled. Marli Devi

## 2019-04-04 RX ORDER — HYDROXYZINE HYDROCHLORIDE 10 MG/1
10 TABLET, FILM COATED ORAL
Qty: 90 TAB | Refills: 3 | Status: SHIPPED | OUTPATIENT
Start: 2019-04-04 | End: 2019-04-14

## 2019-04-04 RX ORDER — OLOPATADINE HYDROCHLORIDE 1 MG/ML
1 SOLUTION/ DROPS OPHTHALMIC 2 TIMES DAILY
Qty: 1 BOTTLE | Refills: 3 | Status: SHIPPED | OUTPATIENT
Start: 2019-04-04

## 2019-04-04 NOTE — TELEPHONE ENCOUNTER
Spoke with patient, he said he didn't see a RX for the atarax or the [facility]anol [facility]  It looks like the atarax was printed   He would like the Patanol and [facility]ax [facility]  He also did not hear from GI, it was sent on 3/15.  I refaxed the referral and gave patient the phone number

## 2019-04-08 ENCOUNTER — TELEPHONE (OUTPATIENT)
Dept: INTERNAL MEDICINE CLINIC | Age: 54
End: 2019-04-08

## 2019-04-08 NOTE — TELEPHONE ENCOUNTER
Pt calling says he was seen on 3/13/2019 and it was supposed to be a physical. Says it wasn't coded that way. Can you please review and re code if needed. I do see in note it says physical done.

## 2019-04-08 NOTE — TELEPHONE ENCOUNTER
Please help me, we did do the physical, but we also addressed different medical complaints and performed additional work up in addition to his general physical, therefore other codes were included. Could you please explain this to the patient.  Thanks

## 2019-07-19 ENCOUNTER — TELEPHONE (OUTPATIENT)
Dept: INTERNAL MEDICINE CLINIC | Age: 54
End: 2019-07-19

## 2019-07-19 ENCOUNTER — OFFICE VISIT (OUTPATIENT)
Dept: INTERNAL MEDICINE CLINIC | Age: 54
End: 2019-07-19

## 2019-07-19 VITALS
HEIGHT: 73 IN | WEIGHT: 253.2 LBS | DIASTOLIC BLOOD PRESSURE: 85 MMHG | BODY MASS INDEX: 33.56 KG/M2 | TEMPERATURE: 98.7 F | HEART RATE: 90 BPM | SYSTOLIC BLOOD PRESSURE: 129 MMHG | OXYGEN SATURATION: 95 % | RESPIRATION RATE: 18 BRPM

## 2019-07-19 DIAGNOSIS — E78.2 MIXED HYPERLIPIDEMIA: ICD-10-CM

## 2019-07-19 DIAGNOSIS — E66.9 DIABETES MELLITUS TYPE 2 IN OBESE (HCC): ICD-10-CM

## 2019-07-19 DIAGNOSIS — F41.9 ANXIETY: ICD-10-CM

## 2019-07-19 DIAGNOSIS — E11.69 DIABETES MELLITUS TYPE 2 IN OBESE (HCC): ICD-10-CM

## 2019-07-19 DIAGNOSIS — R21 RASH: Primary | ICD-10-CM

## 2019-07-19 DIAGNOSIS — Z23 ENCOUNTER FOR IMMUNIZATION: ICD-10-CM

## 2019-07-19 DIAGNOSIS — J30.9 ALLERGIC RHINITIS, UNSPECIFIED SEASONALITY, UNSPECIFIED TRIGGER: ICD-10-CM

## 2019-07-19 DIAGNOSIS — I10 ESSENTIAL HYPERTENSION: ICD-10-CM

## 2019-07-19 LAB — HBA1C MFR BLD HPLC: 6.9 %

## 2019-07-19 RX ORDER — CLINDAMYCIN PHOSPHATE 11.9 MG/ML
SOLUTION TOPICAL 2 TIMES DAILY
COMMUNITY

## 2019-07-19 RX ORDER — TRIAMCINOLONE ACETONIDE 1 MG/G
OINTMENT TOPICAL 2 TIMES DAILY
Qty: 453.6 G | Refills: 11 | Status: SHIPPED | OUTPATIENT
Start: 2019-07-19

## 2019-07-19 RX ORDER — FLUTICASONE PROPIONATE 50 MCG
2 SPRAY, SUSPENSION (ML) NASAL DAILY
Qty: 1 BOTTLE | Refills: 5 | Status: SHIPPED | OUTPATIENT
Start: 2019-07-19 | End: 2019-11-21 | Stop reason: SDUPTHER

## 2019-07-19 NOTE — PROGRESS NOTES
INTERNISTS [facility]:  7/23/2019, MRN: [id number]      Jojo Castro is a 48 y.o. male and presents to clinic to [facility] (  ROOM  4); Diabetes (follow up); and Bipolar    Subjective: The patient is a 53-year-old male with history of anxiety/bipolar disorder, HLD, type 2 diabetes,  vitamin D deficiency, and eczema per EHR. 1. Allergic Rhinitis: Using antihistamine eye drop as needed. His last formal eye exam: a year ago. 2. Anxiety/Bipolar Disorder: He has not seen a psychiatrist in a long time. He just left a stressful job last month. He worked as a . He was diagnosed with bipolar disorder in the past. He takes valium. He takes it once a month. No depression. 3. HTN: He takes losartan. No adverse side effects. BP is 129/85. +H/o snoring. He has never had a sleep study. 4. Type 2 DM: Present x 5 yrs. No CKD or neuropathy rx. On metformin. No adverse side effects from this rx. He walks 30 minutes a day. He checks his BG once a day. He checks his fasting BG and it averages 140s over the past month. His BG ranges from .     5. Health Maintenance:  - Overdue for colon cancer screening. Done a couple of months ago - he had a precancerous polyp. He was told to f/u in 6months.   - Overdue for formal eye exam  -Overdue for a diabetic foot exam    6. HLD: No adverse side effects with simvastatin. 7. Rash: All over. Located along his arms, chest, and back. Treated in the past with steroids. It helped but made him feel \"bonkers. \" Taking zyrtec as needed. No nasal steroid.        Patient Active Problem List    Diagnosis Date Noted    Varicose veins of lower extremities with other complications 08/20/6786    Anxiety 06/23/2014    Diabetes mellitus type 2 in obese ([facility] [address] 12/23/2013    Vitamin D deficiency 12/23/2013    Hypertension 12/23/2013    Elevated blood pressure 11/25/2013    Low back pain 11/25/2013    Overweight(278.02) 11/25/2013    Hyperlipidemia 09/29/2011    Eczema of hand 09/29/2011       Current Outpatient Medications   Medication Sig Dispense Refill    clindamycin (CLEOCIN T) 1 % external solution Apply  to affected area two (2) times a day. use thin film on affected area      triamcinolone acetonide (KENALOG) 0.1 % ointment Apply  to affected area two (2) times a day. use thin layer along affected rash areas 453.6 g 11    olopatadine (PATANOL) 0.1 % ophthalmic solution Administer 1 Drop to both eyes two (2) times a day. 1 Bottle 3    metFORMIN (GLUCOPHAGE) 500 mg tablet Take 1 Tab by mouth two (2) times daily (with meals). 180 Tab 3    simvastatin (ZOCOR) 10 mg tablet TAKE 1 TABLET NIGHTLY 90 Tab 3    losartan (COZAAR) 50 mg tablet TAKE 1 TABLET DAILY 90 Tab 3    ergocalciferol (ERGOCALCIFEROL) 50,000 unit capsule Take 1 Cap by mouth every seven (7) days. 12 Cap 3    mometasone (NASONEX) 50 mcg/actuation nasal spray 2 Sprays by Both Nostrils route daily as needed for Cough. 1 Container 3    diazePAM (VALIUM) 5 mg tablet Take 1 Tab by mouth every twelve (12) hours as needed for Anxiety. 60 Tab 0    glucose blood VI test strips (ASCENSIA AUTODISC VI, ONE TOUCH ULTRA TEST VI) strip Check Blood Sugar Fasting Once a day 1 Package 11    BLOOD GLUCOSE STRIPS-DISPMETER (BLOOD GLUCOSE STRIP-DISP METER) kit 120 Strips by Does Not Apply route daily. 1 Kit 3    Blood-Glucose Meter monitoring kit Check Blood Sugar once daily 1 Kit 0    lancets 32 gauge misc 1 Package by Does Not Apply route daily. 1 Package 3    fluticasone propionate (FLONASE) 50 mcg/actuation nasal spray 2 Sprays by Both Nostrils route daily. 1 Bottle 5       Allergies   Allergen Reactions    Latex Hives    Bee Pollen Anaphylaxis       Past Medical History:   Diagnosis Date    Diabetes ([address]     Eczema     History of seasonal allergies     Hypertension     Vitamin D deficiency        History reviewed. No pertinent surgical history.     Family History   Adopted: Yes   Family history unknown: Yes       Social History     Tobacco Use    Smoking status: Current Every Day Smoker     Years: 15.00     Types: Cigarettes    Smokeless tobacco: Never Used    Tobacco comment: less than 1 pack daily   Substance Use Topics    Alcohol use: Yes     Alcohol/week: 3.3 standard drinks     Types: 4 Cans of beer per week     Comment: moderate       ROS   Review of Systems   Constitutional: Negative for chills and fever. HENT: Negative for ear pain and sore throat. Eyes: Negative for blurred vision and pain. Respiratory: Negative for cough and shortness of breath. Cardiovascular: Negative for chest pain. Gastrointestinal: Negative for abdominal pain, blood in stool and melena. Genitourinary: Negative for dysuria and hematuria. Musculoskeletal: Negative for joint pain and myalgias. Skin: Positive for itching and rash. Neurological: Negative for tingling, focal weakness and headaches. Endo/Heme/Allergies: Does not bruise/bleed easily. Psychiatric/Behavioral: Negative for substance abuse. Objective     Vitals:    07/19/19 0935   BP: 129/85   Pulse: 90   Resp: 18   Temp: 98.7 °F (37.1 °C)   TempSrc: Oral   SpO2: 95%   Weight: 253 lb 3.2 oz (114.9 kg)   Height: 6' 1\" (1.854 m)   PainSc:   0 - No pain       Physical Exam   Constitutional: He is oriented to person, place, and time and well-developed, well-nourished, and in no distress. HENT:   Head: Normocephalic and atraumatic. Right Ear: External ear normal.   Left Ear: External ear normal.   Nose: Nose normal.   Mouth/Throat: Oropharynx is clear and moist. No oropharyngeal exudate. Eyes: Conjunctivae and EOM are normal. Right eye exhibits no discharge. Left eye exhibits no discharge. No scleral icterus. Neck: Neck supple. Cardiovascular: Normal rate, regular rhythm and normal heart sounds. Pulmonary/Chest: Effort normal and breath sounds normal.   Abdominal: Soft. Bowel sounds are normal. He exhibits no distension.  There is no tenderness. There is no rebound and no guarding. Musculoskeletal: He exhibits no edema or tenderness (BUE). Lymphadenopathy:     He has no cervical adenopathy. Neurological: He is alert and oriented to person, place, and time. He exhibits normal muscle tone. Gait normal.   Skin: Skin is warm and dry. No erythema. There is a maculopapular erythematous scattered rash along his chest and upper extremities   Psychiatric: Affect normal.   Nursing note and vitals reviewed.     Diabetic foot exam:     Left: Filament test normal sensation with micro filament   Pulse DP: 2+ (normal)   Pulse PT: 2+ (normal)   Deformities: None  Right: Filament test normal sensation with micro filament   Pulse DP: 2+ (normal)   Pulse PT: 2+ (normal)   Deformities: None      LABS   Data Review:   Lab Results   Component Value Date/Time    WBC 9.7 03/13/2019 01:10 PM    HGB 14.7 03/13/2019 01:10 PM    HCT 46.0 03/13/2019 01:10 PM    PLATELET 934 65/89/2409 01:10 PM    MCV 95.6 03/13/2019 01:10 PM       Lab Results   Component Value Date/Time    Sodium 139 03/13/2019 01:10 PM    Potassium 4.1 03/13/2019 01:10 PM    Chloride 105 03/13/2019 01:10 PM    CO2 29 03/13/2019 01:10 PM    CO2 28 03/13/2019 01:10 PM    Anion gap 6 03/13/2019 01:10 PM    Glucose 133 (H) 03/13/2019 01:10 PM    BUN 10 03/13/2019 01:10 PM    Creatinine 0.96 03/13/2019 01:10 PM    BUN/Creatinine ratio 10 (L) 03/13/2019 01:10 PM    GFR est AA >60 03/13/2019 01:10 PM    GFR est non-AA >60 03/13/2019 01:10 PM    Calcium 8.7 03/13/2019 01:10 PM       Lab Results   Component Value Date/Time    Cholesterol, total 145 03/13/2019 01:10 PM    HDL Cholesterol 58 03/13/2019 01:10 PM    LDL, calculated 72.6 03/13/2019 01:10 PM    VLDL, calculated 14.4 03/13/2019 01:10 PM    Triglyceride 72 03/13/2019 01:10 PM    CHOL/HDL Ratio 2.5 03/13/2019 01:10 PM       Lab Results   Component Value Date/Time    Hemoglobin A1c 7.5 (H) 03/13/2019 01:10 PM    Hemoglobin A1c (POC) 6.9 07/19/2019 09:57 AM       Assessment/Plan:   1. Diabetes mellitus type 2 in obese: A1C is 6.9.  - He was given a carb counting book. I encouraged him to reduce his carbs to 45 g per meal and to no more than 15 g of carb per snack.  - Continue with medication as prescribed.  -We discussed his A1c goals.  -A diabetic foot exam was performed today. ORDERS:  - AMB POC HEMOGLOBIN A1C    2. Rash: Contact dermatitis?  -Placing a referral to Dermatology  -Triamcinolone cream prescribed as needed for pruritus. ORDERS:  - REFERRAL TO DERMATOLOGY  - triamcinolone acetonide (KENALOG) 0.1 % ointment; Apply  to affected area two (2) times a day. use thin layer along affected rash areas  Dispense: 453.6 g; Refill: 11    3. Health Maintenance:  -Pneumococcal 23 vaccine was administered today. -Requesting his last formal eye exam.    ORDERS:  - PNEUMOCOCCAL POLYSACCHARIDE VACCINE, 23-VALENT, ADULT OR IMMUNOSUPPRESSED PT DOSE,    4. Anxiety/Bipolar Disorder:  -The patient declined a referral to psychiatry at this time. We will proceed with observation. 5. HLD: Stable. -Continue with Rx as prescribed.  -We discussed the importance of weight reduction. I will recheck his weight at his follow-up appoint. 6.  Allergic rhinitis:  -She can take over-the-counter Flonase which was discussed with him today. Health Maintenance Due   Topic Date Due    EYE EXAM RETINAL OR DILATED  08/25/1977    Shingrix Vaccine Age 50> (1 of 2) 08/25/2017    FOBT Q 1 YEAR AGE 50-75  08/25/2017     Lab review: labs are reviewed in the EHR    I have discussed the diagnosis with the patient and the intended plan as seen in the above orders. The patient has received an after-visit summary and questions were answered concerning future plans. I have discussed medication side effects and warnings with the patient as well. I have reviewed the plan of care with the patient, accepted their input and they are in agreement with the treatment goals.  All questions were answered. The patient understands the plan of care. Handouts provided today with above information. Pt instructed if symptoms worsen to call the office or report to the ED for continued care. Greater than 50% of the visit time was spent in counseling and/or coordination of care. Voice recognition was used to generate this report, which may have resulted in some phonetic based errors in grammar and contents. Even though attempts were made to correct all the mistakes, some may have been missed, and remained in the body of the document. Follow-up and Dispositions    · Return in about 4 months (around 11/19/2019) for BP check, DM check.          Compa Hunter MD

## 2019-07-19 NOTE — TELEPHONE ENCOUNTER
Referral accepted by [facility] to provide portable tank to pts hospital room prior to DC today. [facility] regarding Triamcinolone ointment e-scribed today---they need to know site of application and also day supply. Please advise them at [phone].

## 2019-07-19 NOTE — PROGRESS NOTES
Chief Complaint   Patient presents with   [address]  4    Diabetes     follow up    Bipolar       1. Have you been to the ER, urgent care clinic since your last visit? Hospitalized since your last visit? No    2. Have you seen or consulted any other health care providers outside of the [address] [place] [address] since your last visit? Include any pap smears or colon screening. No    Patient was given a copy of the Advanced Directive and understands to bring it in once completed.   Health Maintenance Due   Topic Date Due    Pneumococcal 0-64 years (1 of 1 - PPSV23) 08/25/1973    EYE EXAM RETINAL OR DILATED  08/25/1977    FOOT EXAM Q1  12/03/2016    Shingrix Vaccine Age 50> (1 of 2) 08/25/2017    FOBT Q 1 YEAR AGE 50-75  08/25/2017

## 2019-07-19 NOTE — PATIENT INSTRUCTIONS
Health Maintenance Due   Topic Date Due    Pneumococcal 0-64 years (1 of 1 - PPSV23) 08/25/1973    EYE EXAM RETINAL OR DILATED  08/25/1977    FOOT EXAM Q1  12/03/2016    Shingrix Vaccine Age 50> (1 of 2) 08/25/2017    FOBT Q 1 YEAR AGE 50-75  08/25/2017          Body Mass Index: Care Instructions  Your Care Instructions    Body mass index (BMI) can help you see if your weight is raising your risk for health problems. It uses a formula to compare how much you weigh with how tall you are. · A BMI lower than 18.5 is considered underweight. · A BMI between 18.5 and 24.9 is considered healthy. · A BMI between 25 and 29.9 is considered overweight. A BMI of 30 or higher is considered obese. If your BMI is in the normal range, it means that you have a lower risk for weight-related health problems. If your BMI is in the overweight or obese range, you may be at increased risk for weight-related health problems, such as high blood pressure, heart disease, stroke, arthritis or joint pain, and diabetes. If your BMI is in the underweight range, you may be at increased risk for health problems such as fatigue, lower protection (immunity) against illness, muscle loss, bone loss, hair loss, and hormone problems. BMI is just one measure of your risk for weight-related health problems. You may be at higher risk for health problems if you are not active, you eat an unhealthy diet, or you drink too much alcohol or use tobacco products. Follow-up care is a key part of your treatment and safety. Be sure to make and go to all appointments, and call your doctor if you are having problems. It's also a good idea to know your test results and keep a list of the medicines you take. How can you care for yourself at home? · Practice healthy eating habits. This includes eating plenty of fruits, vegetables, whole grains, lean protein, and low-fat dairy. · If your doctor recommends it, get more exercise. Walking is a good choice.  Bit by bit, increase the amount you walk every day. Try for at least 30 minutes on most days of the week. · Do not smoke. Smoking can increase your risk for health problems. If you need help quitting, talk to your doctor about stop-smoking programs and medicines. These can increase your chances of quitting for good. · Limit alcohol to 2 drinks a day for men and 1 drink a day for women. Too much alcohol can cause health problems. If you have a BMI higher than 25  · Your doctor may do other tests to check your risk for weight-related health problems. This may include measuring the distance around your waist. A waist measurement of more than 40 inches in men or 35 inches in women can increase the risk of weight-related health problems. · Talk with your doctor about steps you can take to stay healthy or improve your health. You may need to make lifestyle changes to lose weight and stay healthy, such as changing your diet and getting regular exercise. If you have a BMI lower than 18.5  · Your doctor may do other tests to check your risk for health problems. · Talk with your doctor about steps you can take to stay healthy or improve your health. You may need to make lifestyle changes to gain or maintain weight and stay healthy, such as getting more healthy foods in your diet and doing exercises to build muscle. Where can you learn more? Go to http://kaye-tanvir.info/. Enter S176 in the search box to learn more about \"Body Mass Index: Care Instructions. \"  Current as of: June 25, 2018  Content Version: 11.9  © 5193-8983 [employer]. Care instructions adapted under license by [employer] (which disclaims liability or warranty for this information). If you have questions about a medical condition or this instruction, always ask your healthcare professional. [place] [address] any warranty or liability for your use of this information.       Vaccine Information Statement    Pneumococcal Polysaccharide Vaccine: What You Need to Know    Many Vaccine Information Statements are available in Bengali and other languages. See www.immunize.org/vis. Hojas de información Sobre Vacunas están disponibles en español y en muchos otros idiomas. Visite Vane.si. 1. Why get vaccinated? Vaccination can protect older adults (and some children and younger adults) from pneumococcal disease. Pneumococcal disease is caused by bacteria that can spread from person to person through close contact. It can cause ear infections, and it can also lead to more serious infections of the:   Lungs (pneumonia),   Blood (bacteremia), and   Covering of the brain and spinal cord (meningitis). Meningitis can cause deafness and brain damage, and it can be fatal.      Anyone can get pneumococcal disease, but children under 3years of age, people with certain medical conditions, adults over 72years of age, and cigarette smokers are at the highest risk. About 18,000 older adults die each year from pneumococcal disease in the [place]. Treatment of pneumococcal infections with penicillin and other drugs used to be more effective. But some strains of the disease have become resistant to these drugs. This makes prevention of the disease, through vaccination, even more important. 2. Pneumococcal polysaccharide vaccine (PPSV23)    Pneumococcal polysaccharide vaccine (PPSV23) protects against 23 types of pneumococcal bacteria. It will not prevent all pneumococcal disease. PPSV23 is recommended for:   All adults 72years of age and older,   Anyone 2 through 59years of age with certain long-term health problems,   Anyone 2 through 59years of age with a weakened immune system,   Adults 23 through 59years of age who smoke cigarettes or have asthma. Most people need only one dose of PPSV. A second dose is recommended for certain high-risk groups.   People 65 and older should get a dose even if they have gotten one or more doses of the vaccine before they turned 65. Your healthcare provider can give you more information about these recommendations. Most healthy adults develop protection within 2 to 3 weeks of getting the shot. 3. Some people should not get this vaccine     Anyone who has had a life-threatening allergic reaction to PPSV should not get another dose.  Anyone who has a severe allergy to any component of PPSV should not receive it. Tell your provider if you have any severe allergies.  Anyone who is moderately or severely ill when the shot is scheduled may be asked to wait until they recover before getting the vaccine. Someone with a mild illness can usually be vaccinated.  Children less than 3years of age should not receive this vaccine.  There is no evidence that PPSV is harmful to either a pregnant woman or to her fetus. However, as a precaution, women who need the vaccine should be vaccinated before becoming pregnant, if possible. 4. Risks of a vaccine reaction    With any medicine, including vaccines, there is a chance of side effects. These are usually mild and go away on their own, but serious reactions are also possible. About half of people who get PPSV have mild side effects, such as redness or pain where the shot is given, which go away within about two days. Less than 1 out of 100 people develop a fever, muscle aches, or more severe local reactions. Problems that could happen after any vaccine:     People sometimes faint after a medical procedure, including vaccination. Sitting or lying down for about 15 minutes can help prevent fainting, and injuries caused by a fall. Tell your doctor if you feel dizzy, or have vision changes or ringing in the ears.  Some people get severe pain in the shoulder and have difficulty moving the arm where a shot was given. This happens very rarely.      Any medication can cause a severe allergic reaction. Such reactions from a vaccine are very rare, estimated at about 1 in a million doses, and would happen within a few minutes to a few hours after the vaccination. As with any medicine, there is a very remote chance of a vaccine causing a serious injury or death. The safety of vaccines is always being monitored. For more information, visit: www.cdc.gov/vaccinesafety/     5. What if there is a serious reaction? What should I look for? Look for anything that concerns you, such as signs of a severe allergic reaction, very high fever, or unusual behavior. Signs of a severe allergic reaction can include hives, swelling of the face and throat, difficulty breathing, a fast heartbeat, dizziness, and weakness. These would usually start a few minutes to a few hours after the vaccination. What should I do? If you think it is a severe allergic reaction or other emergency that cant wait, call 9-1-1 or get to the nearest hospital. Otherwise, call your doctor. Afterward, the reaction should be reported to the Vaccine Adverse Event Reporting System (VAERS). Your doctor might file this report, or you can do it yourself through the VAERS web site at www.vaers. hhs.gov, or by calling 2-507.529.4417. VAERS does not give medical advice. 6. How can I learn more?  Ask your doctor. He or she can give you the vaccine package insert or suggest other sources of information.  Call your local or state health department.    Contact the Centers for Disease Control and Prevention (CDC):  - Call 1-572.538.6396 (1-800-CDC-INFO) or  - Visit CDCs website at [employer] 18 04/24/2015    [facility] for Disease Control and Prevention    Office Use Only

## 2019-07-19 NOTE — PROGRESS NOTES
Lee Vang 1965 male who presents for routine immunizations. Patient denies any symptoms , reactions or allergies that would exclude them from being immunized today. Risks and adverse reactions were discussed and the VIS was given to them. All questions were addressed. Order placed for PPSV23,  per Verbal Order from DR. ESTEVEZ with read back. Patient was observed for 15 min post injection. There were no reactions observed.     Adlawa Shoulders, LPN

## 2019-07-23 PROBLEM — J30.9 ALLERGIC RHINITIS: Status: ACTIVE | Noted: 2019-07-23

## 2019-11-20 NOTE — PROGRESS NOTES
Lia Gonzales presents today for   Chief Complaint   Patient presents with   [facility] Form Completion     Physician results form for work            Depression Screening:  3 most recent PHQ Screens 3/13/2019   Little interest or pleasure in doing things Not at all   Feeling down, depressed, irritable, or hopeless Not at all   Total Score PHQ 2 0       Learning Assessment:  Learning Assessment 3/13/2019   PRIMARY LEARNER Patient   HIGHEST LEVEL OF EDUCATION - PRIMARY LEARNER  6 YEARS [address] LEARNER NONE   CO-LEARNER CAREGIVER No   PRIMARY LANGUAGE ENGLISH   LEARNER PREFERENCE PRIMARY READING   ANSWERED BY patient   RELATIONSHIP SELF       Abuse Screening:  Abuse Screening Questionnaire 3/13/2019   Do you ever feel afraid of your partner? N   Are you in a relationship with someone who physically or mentally threatens you? N   Is it safe for you to go home? Y             Coordination of Care:  1. Have you been to the ER, urgent care clinic since your last visit? Hospitalized since your last visit? NO    2. Have you seen or consulted any other health care providers outside of the [address] [place] [address] since your last visit? Include any pap smears or colon screening. NO      Advance Directive:  1. Do you have an advance directive in place? Patient Reply:NO    2. If not, would you like material regarding how to put one in place? Patient Reply: Demetri Aguilera 1965 male who presents for routine immunizations. Patient denies any symptoms , reactions or allergies that would exclude them from being immunized today. Risks and adverse reactions were discussed and the VIS was given to them. All questions were addressed. Order placed for flu vaccine,  per Verbal Order from  with read back. Patient was observed for 15 min post injection. There were no reactions observed.     Kenneth Griffin LPN

## 2019-11-21 ENCOUNTER — OFFICE VISIT (OUTPATIENT)
Dept: INTERNAL MEDICINE CLINIC | Age: 54
End: 2019-11-21

## 2019-11-21 VITALS
BODY MASS INDEX: 33.93 KG/M2 | HEART RATE: 81 BPM | DIASTOLIC BLOOD PRESSURE: 71 MMHG | OXYGEN SATURATION: 95 % | SYSTOLIC BLOOD PRESSURE: 122 MMHG | HEIGHT: 73 IN | RESPIRATION RATE: 18 BRPM | WEIGHT: 256 LBS | TEMPERATURE: 97.9 F

## 2019-11-21 DIAGNOSIS — E55.9 VITAMIN D DEFICIENCY: ICD-10-CM

## 2019-11-21 DIAGNOSIS — I10 ESSENTIAL HYPERTENSION: ICD-10-CM

## 2019-11-21 DIAGNOSIS — Z23 ENCOUNTER FOR IMMUNIZATION: ICD-10-CM

## 2019-11-21 DIAGNOSIS — E66.9 DIABETES MELLITUS TYPE 2 IN OBESE (HCC): Primary | ICD-10-CM

## 2019-11-21 DIAGNOSIS — E78.2 MIXED HYPERLIPIDEMIA: ICD-10-CM

## 2019-11-21 DIAGNOSIS — R06.2 WHEEZING: ICD-10-CM

## 2019-11-21 DIAGNOSIS — R60.0 LEG EDEMA, RIGHT: ICD-10-CM

## 2019-11-21 DIAGNOSIS — E11.69 DIABETES MELLITUS TYPE 2 IN OBESE (HCC): Primary | ICD-10-CM

## 2019-11-21 DIAGNOSIS — F17.218 CIGARETTE NICOTINE DEPENDENCE WITH OTHER NICOTINE-INDUCED DISORDER: ICD-10-CM

## 2019-11-21 PROBLEM — F17.200 NICOTINE DEPENDENCE: Status: ACTIVE | Noted: 2019-11-21

## 2019-11-21 LAB — HBA1C MFR BLD HPLC: 6.8 %

## 2019-11-21 RX ORDER — LOSARTAN POTASSIUM 50 MG/1
TABLET ORAL
Qty: 90 TAB | Refills: 3 | Status: SHIPPED | OUTPATIENT
Start: 2019-11-21

## 2019-11-21 RX ORDER — METFORMIN HYDROCHLORIDE 500 MG/1
500 TABLET ORAL 2 TIMES DAILY WITH MEALS
Qty: 180 TAB | Refills: 3 | Status: SHIPPED | OUTPATIENT
Start: 2019-11-21 | End: 2020-05-15

## 2019-11-21 RX ORDER — SIMVASTATIN 10 MG/1
TABLET, FILM COATED ORAL
Qty: 90 TAB | Refills: 3 | Status: SHIPPED | OUTPATIENT
Start: 2019-11-21

## 2019-11-21 RX ORDER — ALBUTEROL SULFATE 90 UG/1
2 AEROSOL, METERED RESPIRATORY (INHALATION)
Qty: 1 INHALER | Refills: 5 | Status: SHIPPED | OUTPATIENT
Start: 2019-11-21

## 2019-11-21 RX ORDER — ALBUTEROL SULFATE 90 UG/1
2 AEROSOL, METERED RESPIRATORY (INHALATION)
Qty: 1 INHALER | Refills: 5 | Status: SHIPPED | COMMUNITY
Start: 2019-11-21 | End: 2019-11-21 | Stop reason: SDUPTHER

## 2019-11-21 RX ORDER — FLUTICASONE PROPIONATE 50 MCG
2 SPRAY, SUSPENSION (ML) NASAL DAILY
Qty: 1 BOTTLE | Refills: 5 | Status: SHIPPED | OUTPATIENT
Start: 2019-11-21

## 2019-11-21 NOTE — PROGRESS NOTES
[facility]NISTS OF [facility]:  11/21/2019, MRN: [id number]      Avril Mora is a 48 y.o. male and presents to clinic for Form Completion (Physician results form for work)    Subjective: The patient is a 57-year-old male with history of anxiety/bipolar disorder, HLD, type 2 diabetes,  vitamin D deficiency, and eczema per EHR. 1.  Type 2 Diabetes: Taking metformin once days of twice a day as prescribed. No adverse side effects of taking this medication. His weight is 236 pounds. BMI 33. He is not regularly exercising or dieting. He checks his BG randomly at home and they are \"trending down. \" \"Most mornings are 120. \" His A1C was 6.9. He walks a lot. His A1c is down to 6.8. Wt Readings from Last 3 Encounters:   11/21/19 256 lb (116.1 kg)   07/19/19 253 lb 3.2 oz (114.9 kg)   03/13/19 258 lb (117 kg)     2. Hypertension: BP is 122/71. He is taking losartan. No adverse side effects or take his medication. No drug use. 3. HLD: Taking simvastatin. No adverse side effects or take this medication. 4.  Vitamin D Deficiency: He still taking prescription strength vitamin D. He tries to get 30 min of sunlight per day. 5. Colon Polyps: Found on a colonoscopy from May 2019. A f/u one was recommended in 6 months. This has yet to be scheduled. No abdominal pain or rectal bleeding. He is scheduled tomorrow for his colonoscopy. 6. Nicotine Dependence and Wheezing: He has but down on his smoking. He is smoking less than a pack per day. No SOB/cough. He reports some wheezing off and on since having a URI, in between appointments. No chest pain. 7. RLE Edema: He went to [place] via flight last wk and has had increased edema along his RLE. No SOB.       Patient Active Problem List    Diagnosis Date Noted    Allergic rhinitis 07/23/2019    Varicose veins of lower extremities with other complications 50/88/0046    Anxiety 06/23/2014    Diabetes mellitus type 2 in obese ([address] 12/23/2013    Vitamin D deficiency 12/23/2013    Hypertension 12/23/2013    Overweight(278.02) 11/25/2013    Hyperlipidemia 09/29/2011    Eczema of hand 09/29/2011       Current Outpatient Medications   Medication Sig Dispense Refill    clindamycin (CLEOCIN T) 1 % external solution Apply  to affected area two (2) times a day. use thin film on affected area      triamcinolone acetonide (KENALOG) 0.1 % ointment Apply  to affected area two (2) times a day. use thin layer along affected rash areas 453.6 g 11    fluticasone propionate (FLONASE) 50 mcg/actuation nasal spray 2 Sprays by Both Nostrils route daily. 1 Bottle 5    olopatadine (PATANOL) 0.1 % ophthalmic solution Administer 1 Drop to both eyes two (2) times a day. 1 Bottle 3    metFORMIN (GLUCOPHAGE) 500 mg tablet Take 1 Tab by mouth two (2) times daily (with meals). 180 Tab 3    simvastatin (ZOCOR) 10 mg tablet TAKE 1 TABLET NIGHTLY 90 Tab 3    losartan (COZAAR) 50 mg tablet TAKE 1 TABLET DAILY 90 Tab 3    ergocalciferol (ERGOCALCIFEROL) 50,000 unit capsule Take 1 Cap by mouth every seven (7) days. 12 Cap 3    diazePAM (VALIUM) 5 mg tablet Take 1 Tab by mouth every twelve (12) hours as needed for Anxiety. 60 Tab 0    glucose blood VI test strips (ASCENSIA AUTODISC VI, ONE TOUCH ULTRA TEST VI) strip Check Blood Sugar Fasting Once a day 1 Package 11    BLOOD GLUCOSE STRIPS-DISPMETER (BLOOD GLUCOSE STRIP-DISP METER) kit 120 Strips by Does Not Apply route daily. 1 Kit 3    Blood-Glucose Meter monitoring kit Check Blood Sugar once daily 1 Kit 0    lancets 32 gauge misc 1 Package by Does Not Apply route daily. 1 Package 3    mometasone (NASONEX) 50 mcg/actuation nasal spray 2 Sprays by Both Nostrils route daily as needed for Cough.  1 Container 3       Allergies   Allergen Reactions    Latex Hives    Bee Pollen Anaphylaxis       Past Medical History:   Diagnosis Date    Diabetes ([address]     Eczema     History of seasonal allergies     Hypertension     Vitamin D deficiency        No past surgical history on file. Family History   Adopted: Yes   Family history unknown: Yes       Social History     Tobacco Use    Smoking status: Current Every Day Smoker     Years: 15.00     Types: Cigarettes    Smokeless tobacco: Never Used    Tobacco comment: less than 1 pack daily   Substance Use Topics    Alcohol use: Yes     Alcohol/week: 3.3 standard drinks     Types: 4 Cans of beer per week     Comment: moderate       ROS   Review of Systems   Constitutional: Negative for chills and fever. HENT: Negative for ear pain and sore throat. Eyes: Negative for blurred vision and pain. Respiratory: Positive for wheezing (since having a URI in between apts but denies SOB/cough). Negative for cough and shortness of breath. Cardiovascular: Positive for leg swelling (RLE). Negative for chest pain. Gastrointestinal: Negative for abdominal pain, blood in stool and melena. Genitourinary: Negative for dysuria and hematuria. Musculoskeletal: Negative for joint pain and myalgias. Skin: Negative for rash. Neurological: Positive for tingling (off/on along BLE). Negative for focal weakness and headaches. Endo/Heme/Allergies: Does not bruise/bleed easily. Psychiatric/Behavioral: Negative for substance abuse. Objective     Vitals:    11/21/19 0915 11/21/19 0921   BP: (!) 162/91 122/71   Pulse: 83 81   Resp: 18    Temp: 97.9 °F (36.6 °C)    TempSrc: Oral    SpO2: 95%    Weight: 256 lb (116.1 kg)    Height: 6' 1\" (1.854 m)    PainSc:   0 - No pain        Physical Exam  Nursing note reviewed. Constitutional:       Appearance: Normal appearance. He is normal weight. HENT:      Head: Normocephalic and atraumatic. Right Ear: Ear canal and external ear normal.      Left Ear: Ear canal and external ear normal.      Nose: Nose normal.      Mouth/Throat:      Mouth: Mucous membranes are dry. Pharynx: Oropharynx is clear.    Eyes:      Extraocular Movements: Extraocular movements intact. Conjunctiva/sclera: Conjunctivae normal.   Neck:      Musculoskeletal: No muscular tenderness. Cardiovascular:      Rate and Rhythm: Normal rate and regular rhythm. Pulses: Normal pulses. Heart sounds: Normal heart sounds. No murmur. No friction rub. No gallop. Pulmonary:      Effort: Pulmonary effort is normal.      Breath sounds: Normal breath sounds. Abdominal:      General: Bowel sounds are normal. There is no distension. Musculoskeletal:         General: No swelling (His right leg is larger than his left leg). Skin:     General: Skin is warm and dry. Comments: Varicose veins noted on the right leg. There is increased vascularity along his right leg versus his left. Neurological:      General: No focal deficit present. Mental Status: He is alert and oriented to person, place, and time.    Psychiatric:         Mood and Affect: Mood normal.         LABS   Data Review:   Lab Results   Component Value Date/Time    WBC 9.7 03/13/2019 01:10 PM    HGB 14.7 03/13/2019 01:10 PM    HCT 46.0 03/13/2019 01:10 PM    PLATELET 544 57/61/6902 01:10 PM    MCV 95.6 03/13/2019 01:10 PM       Lab Results   Component Value Date/Time    Sodium 139 03/13/2019 01:10 PM    Potassium 4.1 03/13/2019 01:10 PM    Chloride 105 03/13/2019 01:10 PM    CO2 29 03/13/2019 01:10 PM    CO2 28 03/13/2019 01:10 PM    Anion gap 6 03/13/2019 01:10 PM    Glucose 133 (H) 03/13/2019 01:10 PM    BUN 10 03/13/2019 01:10 PM    Creatinine 0.96 03/13/2019 01:10 PM    BUN/Creatinine ratio 10 (L) 03/13/2019 01:10 PM    GFR est AA >60 03/13/2019 01:10 PM    GFR est non-AA >60 03/13/2019 01:10 PM    Calcium 8.7 03/13/2019 01:10 PM       Lab Results   Component Value Date/Time    Cholesterol, total 145 03/13/2019 01:10 PM    HDL Cholesterol 58 03/13/2019 01:10 PM    LDL, calculated 72.6 03/13/2019 01:10 PM    VLDL, calculated 14.4 03/13/2019 01:10 PM    Triglyceride 72 03/13/2019 01:10 PM    CHOL/HDL Ratio 2.5 03/13/2019 01:10 PM       Lab Results   Component Value Date/Time    Hemoglobin A1c 7.5 (H) 03/13/2019 01:10 PM    Hemoglobin A1c (POC) 6.9 07/19/2019 09:57 AM       Assessment/Plan:   1. Health Maintenance:  Flu vaccine was administered today. I encouraged him to get his colonoscopy tomorrow.  He declines medical therapy options for underlying neuropathy/numbness and tingling in legs. We discussed how neuropathic pain medication could impact his mood. This is important since he is opting to not control his bipolar disorder with medication at this time, against my recommendation given to him at his last appointment. ORDERS:  - INFLUENZA VIRUS VAC QUAD,SPLIT,PRESV FREE SYRINGE IM  - [place] IMMUNIZ ADMIN,1 SINGLE/COMB VAC/TOXOID    2. Diabetes mellitus type 2 in obese: Stable. Continue with Rx as prescribed. I am refilling his metformin.  -Checking labs just before his follow-up appointment (urine protein screen, A1c, lipid panel, and a CMP). I encouraged him to reduce his carbs. We discussed the importance of reducing his weight as well. I will recheck his weight at his follow-up appointment. 3. Leg edema, right: Ruling out DVT. He has varicose veins along his right leg, more pronounced than on his left leg. He could have underlying venous insufficiency. I instructed him to the ED if he develops any shortness of breath or worsening symptoms.  Ordering a PVL study of his right leg. ORDERS:  - DUPLEX LOWER EXT VENOUS RIGHT; Future    4. Wheezing: PE findings are reassuring. He is currently smoking less than 1 pack of cigarettes per day. Ordering a chest x-ray.  Ordering albuterol for STDs. I encouraged him to stop smoking. ORDERS:  - XR CHEST AP LAT; Future  - albuterol (PROVENTIL HFA, VENTOLIN HFA, PROAIR HFA) 90 mcg/actuation inhaler; Take 2 Puffs by inhalation every six (6) hours as needed for Wheezing. Dispense: 1 Inhaler;  Refill: 5  - albuterol (PROVENTIL HFA, VENTOLIN HFA, PROAIR HFA) 90 mcg/actuation inhaler; Take 2 Puffs by inhalation every six (6) hours as needed for Wheezing. Dispense: 1 Inhaler; Refill: 5    5. Hyperlipidemia LDL goal <70:  Refilling his simvastatin. Checking labs just before his follow-up appointment (CMP and lipid panel). ORDERS:  - simvastatin (ZOCOR) 10 mg tablet; TAKE 1 TABLET NIGHTLY  Dispense: 90 Tab; Refill: 3    6. HTN: Stable. Continue with Rx as prescribed. I am refilling it. Return to clinic for BP check.  Checking a urine protein screen, A1c, lipid panel, and a CMP just before his follow-up appointment. ORDERS:  - losartan (COZAAR) 50 mg tablet; TAKE 1 TABLET DAILY  Dispense: 90 Tab; Refill: 3    7. Vitamin D Deficiency:  Continue with prescription strength vitamin D.  Checking a level just before his follow-up appointment along with a CMP. Health Maintenance Due   Topic Date Due    Shingrix Vaccine Age 49> (1 of 2) 08/25/2017    FOBT Q 1 YEAR AGE 50-75  08/25/2017    Influenza Age 5 to Adult  08/01/2019     Lab review: labs are reviewed in the EHR and ordered as mentioned above. I have discussed the diagnosis with the patient and the intended plan as seen in the above orders. The patient has received an after-visit summary and questions were answered concerning future plans. I have discussed medication side effects and warnings with the patient as well. I have reviewed the plan of care with the patient, accepted their input and they are in agreement with the treatment goals. All questions were answered. The patient understands the plan of care. Handouts provided today with above information. Pt instructed if symptoms worsen to call the office or report to the ED for continued care. Greater than 50% of the visit time was spent in counseling and/or coordination of care. The patient was counseled on the dangers of tobacco use, and was advised to quit.   Reviewed strategies to maximize success, including removing cigarettes and smoking materials from environment. Voice recognition was used to generate this report, which may have resulted in some phonetic based errors in grammar and contents. Even though attempts were made to correct all the mistakes, some may have been missed, and remained in the body of the document.           Mellissa Root MD

## 2019-11-21 NOTE — PATIENT INSTRUCTIONS
Vaccine Information Statement Influenza (Flu) Vaccine (Inactivated or Recombinant): What You Need to Know Many Vaccine Information Statements are available in Frisian and other languages. See www.immunize.org/vis Hojas de información sobre vacunas están disponibles en español y en muchos otros idiomas. Visite www.immunize.org/vis 1. Why get vaccinated? Influenza vaccine can prevent influenza (flu). Flu is a contagious disease that spreads around the [facility] every year, usually between October and May. Anyone can get the flu, but it is more dangerous for some people. Infants and young children, people 72years of age and older, pregnant women, and people with certain health conditions or a weakened immune system are at greatest risk of flu complications. Pneumonia, bronchitis, sinus infections and ear infections are examples of flu-related complications. If you have a medical condition, such as heart disease, cancer or diabetes, flu can make it worse. Flu can cause fever and chills, sore throat, muscle aches, fatigue, cough, headache, and runny or stuffy nose. Some people may have vomiting and diarrhea, though this is more common in children than adults. Each year thousands of people in the [facility] die from flu, and many more are hospitalized. Flu vaccine prevents millions of illnesses and flu-related visits to the doctor each year. 2. Influenza vaccines CDC recommends everyone 10months of age and older get vaccinated every flu season. Children 6 months through 6years of age may need 2 doses during a single flu season. Everyone else needs only 1 dose each flu season. It takes about 2 weeks for protection to develop after vaccination. There are many flu viruses, and they are always changing. Each year a new flu vaccine is made to protect against three or four viruses that are likely to cause disease in the upcoming flu season.  Even when the vaccine doesnt exactly match these viruses, it may still provide some protection. Influenza vaccine does not cause flu. Influenza vaccine may be given at the same time as other vaccines. 3. Talk with your health care provider Tell your vaccine provider if the person getting the vaccine: 
 Has had an allergic reaction after a previous dose of influenza vaccine, or has any severe, life-threatening allergies.  Has ever had Guillain-Barré Syndrome (also called GBS). In some cases, your health care provider may decide to postpone influenza vaccination to a future visit. People with minor illnesses, such as a cold, may be vaccinated. People who are moderately or severely ill should usually wait until they recover before getting influenza vaccine. Your health care provider can give you more information. 4. Risks of a reaction  Soreness, redness, and swelling where shot is given, fever, muscle aches, and headache can happen after influenza vaccine.  There may be a very small increased risk of Guillain-Barré Syndrome (GBS) after inactivated influenza vaccine (the flu shot). [facility] children who get the flu shot along with pneumococcal vaccine (PCV13), and/or DTaP vaccine at the same time might be slightly more likely to have a seizure caused by fever. Tell your health care provider if a child who is getting flu vaccine has ever had a seizure. People sometimes faint after medical procedures, including vaccination. Tell your provider if you feel dizzy or have vision changes or ringing in the ears. As with any medicine, there is a very remote chance of a vaccine causing a severe allergic reaction, other serious injury, or death. 5. What if there is a serious problem? An allergic reaction could occur after the vaccinated person leaves the clinic.  If you see signs of a severe allergic reaction (hives, swelling of the face and throat, difficulty breathing, a fast heartbeat, dizziness, or weakness), call 9-1-1 and get the person to the nearest hospital. 
 
For other signs that concern you, call your health care provider. Adverse reactions should be reported to the Vaccine Adverse Event Reporting System (VAERS). Your health care provider will usually file this report, or you can do it yourself. Visit the VAERS website at www.vaers. hhs.gov or call 0-457.723.8299. VAERS is only for reporting reactions, and VAERS staff do not give medical advice. 6. The National Vaccine Injury Compensation Program 
 
The [facility] Vaccine Injury Compensation Program (VICP) is a federal program that was created to compensate people who may have been injured by certain vaccines. Visit the VICP website at www.hrsa.gov/vaccinecompensation or call 8-679.672.5966 to learn about the program and about filing a claim. There is a time limit to file a claim for compensation. 7. How can I learn more?  Ask your health care provider.  Call your local or state health department.  Contact the Centers for Disease Control and Prevention (CDC): 
- Call 9-420.626.6831 (3-358-SRU-INFO) or 
- Visit CDCs influenza website at www.cdc.gov/flu Vaccine Information Statement (Interim) Inactivated Influenza Vaccine 8/15/2019 
[address] [place] [id number] Department of Health and [employer] Centers for Disease Control and Prevention Office Use Only Learning About Diabetes Food Guidelines Your Care Instructions Meal planning is important to manage diabetes. It helps keep your blood sugar at a target level (which you set with your doctor). You don't have to eat special foods. You can eat what your family eats, including sweets once in a while. But you do have to pay attention to how often you eat and how much you eat of certain foods. You may want to work with a dietitian or a certified diabetes educator (CDE) to help you plan meals and snacks.  A dietitian or CDE can also help you lose weight if that is one of your goals. What should you know about eating carbs? Managing the amount of carbohydrate (carbs) you eat is an important part of healthy meals when you have diabetes. Carbohydrate is found in many foods. · Learn which foods have carbs. And learn the amounts of carbs in different foods. ? Bread, cereal, pasta, and rice have about 15 grams of carbs in a serving. A serving is 1 slice of bread (1 ounce), ½ cup of cooked cereal, or 1/3 cup of cooked pasta or rice. ? Fruits have 15 grams of carbs in a serving. A serving is 1 small fresh fruit, such as an apple or orange; ½ of a banana; ½ cup of cooked or canned fruit; ½ cup of fruit juice; 1 cup of melon or raspberries; or 2 tablespoons of dried fruit. ? Milk and no-sugar-added yogurt have 15 grams of carbs in a serving. A serving is 1 cup of milk or 2/3 cup of no-sugar-added yogurt. ? Starchy vegetables have 15 grams of carbs in a serving. A serving is ½ cup of mashed potatoes or sweet potato; 1 cup winter squash; ½ of a small baked potato; ½ cup of cooked beans; or ½ cup cooked corn or green peas. · Learn how much carbs to eat each day and at each meal. A dietitian or CDE can teach you how to keep track of the amount of carbs you eat. This is called carbohydrate counting. · If you are not sure how to count carbohydrate grams, use the Plate Method to plan meals. It is a good, quick way to make sure that you have a balanced meal. It also helps you spread carbs throughout the day. ? Divide your plate by types of foods. Put non-starchy vegetables on half the plate, meat or other protein food on one-quarter of the plate, and a grain or starchy vegetable in the final quarter of the plate.  To this you can add a small piece of fruit and 1 cup of milk or yogurt, depending on how many carbs you are supposed to eat at a meal. 
· Try to eat about the same amount of carbs at each meal. Do not \"save up\" your daily allowance of carbs to eat at one meal. 
· Proteins have very little or no carbs per serving. Examples of proteins are beef, chicken, turkey, fish, eggs, tofu, cheese, cottage cheese, and peanut butter. A serving size of meat is 3 ounces, which is about the size of a deck of cards. Examples of meat substitute serving sizes (equal to 1 ounce of meat) are 1/4 cup of cottage cheese, 1 egg, 1 tablespoon of peanut butter, and ½ cup of tofu. How can you eat out and still eat healthy? · Learn to estimate the serving sizes of foods that have carbohydrate. If you measure food at home, it will be easier to estimate the amount in a serving of restaurant food. · If the meal you order has too much carbohydrate (such as potatoes, corn, or baked beans), ask to have a low-carbohydrate food instead. Ask for a salad or green vegetables. · If you use insulin, check your blood sugar before and after eating out to help you plan how much to eat in the future. · If you eat more carbohydrate at a meal than you had planned, take a walk or do other exercise. This will help lower your blood sugar. What else should you know? · Limit saturated fat, such as the fat from meat and dairy products. This is a healthy choice because people who have diabetes are at higher risk of heart disease. So choose lean cuts of meat and nonfat or low-fat dairy products. Use olive or canola oil instead of butter or shortening when cooking. · Don't skip meals. Your blood sugar may drop too low if you skip meals and take insulin or certain medicines for diabetes. · Check with your doctor before you drink alcohol. Alcohol can cause your blood sugar to drop too low. Alcohol can also cause a bad reaction if you take certain diabetes medicines. Follow-up care is a key part of your treatment and safety.  Be sure to make and go to all appointments, and call your doctor if you are having problems. It's also a good idea to know your test results and keep a list of the medicines you take. Where can you learn more? Go to http://kaye-tanvir.info/. Enter U987 in the search box to learn more about \"Learning About Diabetes Food Guidelines. \" Current as of: April 16, 2019 Content Version: 12.2 © 3903-9994 [employer]. Care instructions adapted under license by [employer] (which disclaims liability or warranty for this information). If you have questions about a medical condition or this instruction, always ask your healthcare professional. [place] [address] any warranty or liability for your use of this information. Learning About Tests When You Have Diabetes Why do you need regular diabetes tests? Diabetes can be hard on your body if it's not well controlled. But having tests on a regular schedule can help you and your doctor find problems early, when it's easier to start managing them. What tests do you need? The tests you may have, how often you should have them, and the goals of the tests are: 
A1c blood test. This test shows the average level of blood sugar over the past 2 to 3 months. It helps your doctor see whether blood sugar levels have been staying within your target range. · How often: Every 3 to 6 months · Goal: A blood sugar level in your target range Blood pressure test: This test measures the pressure of blood flow in the arteries. Controlling blood pressure can help prevent damage to nerves and blood vessels. · How often: Every 3 to 6 months · Goal: A blood pressure level in your target range Cholesterol test: This test measures the amount of a type of fat in the blood. It is common for people with diabetes to also have high cholesterol. Too much cholesterol in the blood can build up inside the blood vessels and raise the risk for heart attack and stroke. · How often: At the time of your diabetes diagnosis, and as often as your doctor recommends after that · Goal: A cholesterol level in your target range Albumin-creatinine ratio test: This test checks for kidney damage by looking for the protein albumin (say \"al-BYOO-scottie\") in the urine. Albumin is normally found in the blood. Kidney damage can let small amounts of it (microalbumin) leak into the urine. · How often: Once a year · Goal: No protein in the urine Blood creatinine test/estimated glomerular filtration (eGFR): The blood creatinine (say \"nyue-BV-fw-neen\") level shows how well your kidneys are working. Creatinine is a waste product that muscles release into the blood. Blood creatinine is used to estimate the glomerular filtration rate. A high level of creatinine and/or a low eGFR may mean your kidneys are not working as well as they should. · How often: Once a year · Goal: Normal level of creatinine in the blood. The eGFR goal is greater than 60 mL/min/1.73 m². Complete foot exam: The doctor checks for foot sores and whether any sensation has been lost. 
· How often: Once a year · Goal: Healthy feet with no foot ulcers or loss of feeling Dental exam and cleaning: The dentist checks for gum disease and tooth decay. People with high blood sugar are more likely to have these problems. · How often: Every 6 months · Goal: Healthy teeth and gums Complete eye exam: High blood sugar levels can damage the eyes. This exam is done by an ophthalmologist or optometrist. It includes a dilated eye exam. The exam shows whether there's damage to the back of the eye (diabetic retinopathy). · How often: Once a year. If you don't have any signs of diabetic retinopathy, your doctor may recommend an exam every 2 years. · Goal: No damage to the back of the eye Thyroid-stimulating hormone (TSH) blood test: This test checks for thyroid disease.  Too little thyroid hormone can cause some medicines (like insulin) to stay in the body longer. This can cause low blood sugar. You may be tested if you have high cholesterol or are a woman over 48years old. · How often: As part of your diabetes diagnosis, and as often as your doctor recommends after that · Goal: Normal level of TSH in the blood Follow-up care is a key part of your treatment and safety. Be sure to make and go to all appointments, and call your doctor if you are having problems. It's also a good idea to know your test results and keep a list of the medicines you take. Where can you learn more? Go to http://kaye-tanvir.info/. Enter [id number] in the search box to learn more about \"Learning About Tests When You Have Diabetes. \" Current as of: April 16, 2019 Content Version: 12.2 © 7032-0360 [employer]. Care instructions adapted under license by [employer] (which disclaims liability or warranty for this information). If you have questions about a medical condition or this instruction, always ask your healthcare professional. [address] any warranty or liability for your use of this information.

## 2019-11-22 LAB — COLONOSCOPY, EXTERNAL: NORMAL

## 2019-11-27 ENCOUNTER — HOSPITAL ENCOUNTER (OUTPATIENT)
Dept: VASCULAR SURGERY | Age: 54
Discharge: HOME | End: 2019-11-27
Attending: INTERNAL MEDICINE
Payer: COMMERCIAL

## 2019-11-27 ENCOUNTER — TELEPHONE (OUTPATIENT)
Dept: INTERNAL MEDICINE CLINIC | Age: 54
End: 2019-11-27

## 2019-11-27 DIAGNOSIS — R60.0 LEG EDEMA, RIGHT: ICD-10-CM

## 2019-11-27 PROCEDURE — 93971 EXTREMITY STUDY: CPT

## 2019-11-27 NOTE — PROGRESS NOTES
Please let him know that his study does not show any evidence of DVT.     Dr. Armida Ibrahim  [facility], [facility], [address] [place] [address] Str.  Phone: [phone]  Fax: [phone]

## 2019-11-27 NOTE — TELEPHONE ENCOUNTER
----- Message from Bree Matson MD sent at 11/27/2019  2:23 PM EST -----  Please let him know that his study does not show any evidence of DVT.     Dr. Erika Lopez  [facility], [address] [place] [address], [address] [place] [address] Str.  Phone: [phone]  Fax: [phone]

## 2020-04-24 ENCOUNTER — VIRTUAL VISIT (OUTPATIENT)
Dept: INTERNAL MEDICINE CLINIC | Age: 55
End: 2020-04-24

## 2020-04-24 ENCOUNTER — TELEPHONE (OUTPATIENT)
Dept: INTERNAL MEDICINE CLINIC | Age: 55
End: 2020-04-24

## 2020-04-24 DIAGNOSIS — E78.2 MIXED HYPERLIPIDEMIA: ICD-10-CM

## 2020-04-24 DIAGNOSIS — F17.218 CIGARETTE NICOTINE DEPENDENCE WITH OTHER NICOTINE-INDUCED DISORDER: ICD-10-CM

## 2020-04-24 DIAGNOSIS — E55.9 VITAMIN D DEFICIENCY: ICD-10-CM

## 2020-04-24 DIAGNOSIS — E11.69 DIABETES MELLITUS TYPE 2 IN OBESE (HCC): Primary | ICD-10-CM

## 2020-04-24 DIAGNOSIS — I10 ESSENTIAL HYPERTENSION: ICD-10-CM

## 2020-04-24 DIAGNOSIS — E66.9 DIABETES MELLITUS TYPE 2 IN OBESE (HCC): Primary | ICD-10-CM

## 2020-04-24 NOTE — PROGRESS NOTES
Aureliano Coppola is a 48 y.o. male who was seen by synchronous (real-time) audio-video technology on 4/24/2020. Assessment & Plan:   1. Essential hypertension  - C/w rx as prescribed pending lab results.  - RTC for an in office BP check  - Checking labs. ORDERS:  - METABOLIC PANEL, COMPREHENSIVE; Future  - LIPID PANEL; Future  - MICROALBUMIN, UR, RAND W/ MICROALB/CREAT RATIO; Future  - HEMOGLOBIN A1C W/O EAG; Future    2. Diabetes mellitus type 2 in obese: Stable. - Checking labs. - I encouraged him to eat a healthier diet. I will check his weight at his f/u apt. - C/w rx as prescribed pending lab results. ORDERS:  - METABOLIC PANEL, COMPREHENSIVE; Future  - LIPID PANEL; Future  - MICROALBUMIN, UR, RAND W/ MICROALB/CREAT RATIO; Future  - HEMOGLOBIN A1C W/O EAG; Future    3. Mixed hyperlipidemia:   - C/w rx as prescribed. - Checking labs. ORDERS:  - METABOLIC PANEL, COMPREHENSIVE; Future  - LIPID PANEL; Future    4. Vitamin D deficiency:   - I recommended OTC vitamin D pending his f/u lab results. - Checking labs. ORDERS:  - METABOLIC PANEL, COMPREHENSIVE; Future  - VITAMIN D, 25 HYDROXY; Future    5. Cigarette nicotine dependence with other nicotine-induced disorder: Smoking 1ppd  - I encouraged him to cut back on smoking.  - Albuterol prn. Lab review: labs are reviewed in the EHR and ordered as mentioned above     I have discussed the diagnosis with the patient and the intended plan as seen in the above orders. I have discussed medication side effects and warnings with the patient as well. I have reviewed the plan of care with the patient, accepted their input and they are in agreement with the treatment goals. All questions were answered. The patient understands the plan of care. Pt instructed if symptoms worsen to call the office or report to the ED for continued care. Greater than 50% of the visit time was spent in counseling and/or coordination of care.      Voice recognition was used to generate this report, which may have resulted in some phonetic based errors in grammar and contents. Even though attempts were made to correct all the mistakes, some may have been missed, and remained in the body of the document. Subjective:   Tiffanie Hernandez was seen for   Chief Complaint   Patient presents with    Follow-up     The patient is a 61-year-old male with history of anxiety/bipolar disorder, HLD, type 2 diabetes, HTN,  vitamin D deficiency, and eczema per EHR.     1. Type 2 Diabetes: Overdue for labs. Taking metformin. His last A1C was from 2019 and measured 6.9. Diet: \"Could be better\" per pt hx.     2. HTN: Home BP checks: none. On losartan. He has not had his BP checked in >3 months. 3. HLD: Treated with zocor. No adverse side effects from this rx. 4. Vitamin D Deficiency: He completed the prescription strength vitamin D 6 months ago. 5. Nicotine Dependence: At his last apt, he reported smoking <1ppd of cigarettes. Today he reports: 1ppd. SOB: none; +cough. Using albuterol on avg: rarely.          Prior to Admission medications    Medication Sig Start Date End Date Taking? Authorizing Provider   metFORMIN (GLUCOPHAGE) 500 mg tablet Take 1 Tab by mouth two (2) times daily (with meals). 11/21/19   Taylor Lozano MD   simvastatin (ZOCOR) 10 mg tablet TAKE 1 TABLET NIGHTLY 11/21/19   Taylor Lozano MD   losartan (COZAAR) 50 mg tablet TAKE 1 TABLET DAILY 11/21/19   Taylor Lozano MD   albuterol (PROVENTIL HFA, VENTOLIN HFA, PROAIR HFA) 90 mcg/actuation inhaler Take 2 Puffs by inhalation every six (6) hours as needed for Wheezing. 11/21/19   Taylor Lozano MD   fluticasone propionate (FLONASE) 50 mcg/actuation nasal spray 2 Sprays by Both Nostrils route daily. 11/21/19   Taylor Lozano MD   clindamycin (CLEOCIN T) 1 % external solution Apply  to affected area two (2) times a day.  use thin film on affected area    Provider, Historical   triamcinolone acetonide (KENALOG) 0.1 % ointment Apply  to affected area two (2) times a day. use thin layer along affected rash areas 7/19/19   Yaneli Elias MD   olopatadine (PATANOL) 0.1 % ophthalmic solution Administer 1 Drop to both eyes two (2) times a day. 4/4/19   Prudence Lucas MD   ergocalciferol (ERGOCALCIFEROL) 50,000 unit capsule Take 1 Cap by mouth every seven (7) days. 3/13/19   Maximino Denton MD   mometasone (NASONEX) 50 mcg/actuation nasal spray 2 Sprays by Both Nostrils route daily as needed for Cough. 3/13/19   Maximino Denton MD   diazePAM (VALIUM) 5 mg tablet Take 1 Tab by mouth every twelve (12) hours as needed for Anxiety. 3/13/19   Maximino Denton MD   glucose blood VI test strips (ASCENSIA AUTODISC VI, ONE TOUCH ULTRA TEST VI) strip Check Blood Sugar Fasting Once a day 9/24/14   Bereket Rivera, DO   BLOOD GLUCOSE STRIPS-DISPMETER (BLOOD GLUCOSE STRIP-DISP METER) kit 120 Strips by Does Not Apply route daily. 4/10/14   Shelly Flynn NP   Blood-Glucose Meter monitoring kit Check Blood Sugar once daily 12/23/13   Bereket Rivera, DO   lancets 32 gauge misc 1 Package by Does Not Apply route daily. 12/23/13   Bereket Rivera, DO     Allergies   Allergen Reactions    Latex Hives    Bee Pollen Anaphylaxis     Past Medical History:   Diagnosis Date    Diabetes ([facility] [address]     Eczema     History of seasonal allergies     Hypertension     Vitamin D deficiency      No past surgical history on file.   Family History   Adopted: Yes   Family history unknown: Yes     Social History     Socioeconomic History    Marital status: SINGLE     Spouse name: Not on file    Number of children: Not on file    Years of education: 25    Highest education level: Not on file   Occupational History    Occupation:      Employer: [employer]   Tobacco Use    Smoking status: Current Every Day Smoker     Years: 15.00     Types: Cigarettes    Smokeless tobacco: Never Used    Tobacco comment: less than 1 pack daily   Substance and Sexual Activity    Alcohol use: Yes     Alcohol/week: 3.3 standard drinks     Types: 4 Cans of beer per week     Comment: moderate    Drug use: No    Sexual activity: Never     Partners: Female       ROS:  Gen: No fever/chills  HEENT: No sore throat, eye pain or ear pain. +Seasonal allergy sx. CV: No CP  Resp: No SOB but has a longstanding smoker's cough.   GI: No abdominal pain  : No hematuria/dysuria  Derm: No rash  Neuro: No new paresthesias/weakness  Musc: No new myalgias/jt pain  Psych: No depression sx      Objective:     General: alert, cooperative, no distress   Mental  status: mental status: alert, oriented to person, place, and time, normal mood, behavior, speech, dress, motor activity, and thought processes   Resp: resp: normal effort and no respiratory distress   Neuro: neuro: no gross deficits   Skin: skin: no discoloration or lesions of concern on visible areas     LABS:  Lab Results   Component Value Date/Time    Sodium 139 03/13/2019 01:10 PM    Potassium 4.1 03/13/2019 01:10 PM    Chloride 105 03/13/2019 01:10 PM    CO2 29 03/13/2019 01:10 PM    CO2 28 03/13/2019 01:10 PM    Anion gap 6 03/13/2019 01:10 PM    Glucose 133 (H) 03/13/2019 01:10 PM    BUN 10 03/13/2019 01:10 PM    Creatinine 0.96 03/13/2019 01:10 PM    BUN/Creatinine ratio 10 (L) 03/13/2019 01:10 PM    GFR est AA >60 03/13/2019 01:10 PM    GFR est non-AA >60 03/13/2019 01:10 PM    Calcium 8.7 03/13/2019 01:10 PM       Lab Results   Component Value Date/Time    Cholesterol, total 145 03/13/2019 01:10 PM    HDL Cholesterol 58 03/13/2019 01:10 PM    LDL, calculated 72.6 03/13/2019 01:10 PM    VLDL, calculated 14.4 03/13/2019 01:10 PM    Triglyceride 72 03/13/2019 01:10 PM    CHOL/HDL Ratio 2.5 03/13/2019 01:10 PM       Lab Results   Component Value Date/Time    WBC 9.7 03/13/2019 01:10 PM    HGB 14.7 03/13/2019 01:10 PM    HCT 46.0 03/13/2019 01:10 PM    PLATELET 189 03/13/2019 01:10 PM    MCV 95.6 03/13/2019 01:10 PM       Lab Results   Component Value Date/Time    Hemoglobin A1c 7.5 (H) 03/13/2019 01:10 PM    Hemoglobin A1c (POC) 6.8 11/21/2019 10:02 AM       Lab Results   Component Value Date/Time    TSH 0.91 03/13/2019 01:10 PM           Due to this being a TeleHealth  evaluation, many elements of the physical examination are unable to be assessed. The pt was seen by synchronous (real-time) audio-video technology, and/or her healthcare decision maker, is aware that this patient-initiated, Telehealth encounter is a billable service, with coverage as determined by her insurance carrier. She is aware that she may receive a bill and has provided verbal consent to proceed: Yes. The pt is being evaluated by a video visit encounter for concerns as above. A caregiver was present when appropriate. Due to this being a TeleHealth encounter (During [facility] emergency), evaluation of the following organ systems was limited: Vitals/Constitutional/EENT/Resp/CV/GI//MS/Neuro/Skin/Heme-Lymph-Imm. Pursuant to the emergency declaration under the [facility] waiver authority and the [employer] Act, this Virtual  Visit was conducted, with patient's (and/or legal guardian's) consent, to reduce the patient's risk of exposure to COVID-19 and provide necessary medical care. Services were provided through a video synchronous discussion virtually to substitute for in-person clinic visit. Patient and provider were located at their individual homes. We discussed the expected course, resolution and complications of the diagnosis(es) in detail. Medication risks, benefits, costs, interactions, and alternatives were discussed as indicated. I advised him to contact the office if his condition worsens, changes or fails to improve as anticipated.  He expressed understanding with the diagnosis(es) and plan.      Steve Burgess MD

## 2020-04-24 NOTE — TELEPHONE ENCOUNTER
----- Message from Queenie Estrada MD sent at 4/24/2020  1:00 PM EDT -----  Regarding: Lab apt and f/u apt needed  Please schedule the pt for a follow up apt with me in October. Please schedule him for labs next week.     Respectfully,  Dr. Little Pagan  [facility], [address] [place] [address], [address] [place] [address] Str.  Phone: [phone]  Fax: [phone]

## 2020-04-29 ENCOUNTER — APPOINTMENT (OUTPATIENT)
Dept: INTERNAL MEDICINE CLINIC | Age: 55
End: 2020-04-29

## 2020-04-29 ENCOUNTER — HOSPITAL ENCOUNTER (OUTPATIENT)
Dept: LAB | Age: 55
Discharge: HOME | End: 2020-04-29
Payer: COMMERCIAL

## 2020-04-29 DIAGNOSIS — E78.2 MIXED HYPERLIPIDEMIA: ICD-10-CM

## 2020-04-29 DIAGNOSIS — I10 ESSENTIAL HYPERTENSION: ICD-10-CM

## 2020-04-29 DIAGNOSIS — E11.69 DIABETES MELLITUS TYPE 2 IN OBESE (HCC): ICD-10-CM

## 2020-04-29 DIAGNOSIS — E66.9 DIABETES MELLITUS TYPE 2 IN OBESE (HCC): ICD-10-CM

## 2020-04-29 DIAGNOSIS — E55.9 VITAMIN D DEFICIENCY: ICD-10-CM

## 2020-04-29 LAB
25(OH)D3 SERPL-MCNC: 28.6 NG/ML (ref 30–100)
ALBUMIN SERPL-MCNC: 3.7 G/DL (ref 3.4–5)
ALBUMIN/GLOB SERPL: 1.2 {RATIO} (ref 0.8–1.7)
ALP SERPL-CCNC: 86 U/L (ref 45–117)
ALT SERPL-CCNC: 32 U/L (ref 16–61)
ANION GAP SERPL CALC-SCNC: 7 MMOL/L (ref 3–18)
AST SERPL-CCNC: 18 U/L (ref 10–38)
BILIRUB SERPL-MCNC: 0.9 MG/DL (ref 0.2–1)
BUN SERPL-MCNC: 9 MG/DL (ref 7–18)
BUN/CREAT SERPL: 10 (ref 12–20)
CALCIUM SERPL-MCNC: 8.6 MG/DL (ref 8.5–10.1)
CHLORIDE SERPL-SCNC: 102 MMOL/L (ref 100–111)
CHOLEST SERPL-MCNC: 164 MG/DL
CO2 SERPL-SCNC: 28 MMOL/L (ref 21–32)
CREAT SERPL-MCNC: 0.89 MG/DL (ref 0.6–1.3)
CREAT UR-MCNC: 195 MG/DL (ref 30–125)
GLOBULIN SER CALC-MCNC: 3 G/DL (ref 2–4)
GLUCOSE SERPL-MCNC: 193 MG/DL (ref 74–99)
HBA1C MFR BLD: 8.2 % (ref 4.2–5.6)
HDLC SERPL-MCNC: 50 MG/DL (ref 40–60)
HDLC SERPL: 3.3 {RATIO} (ref 0–5)
LDLC SERPL CALC-MCNC: 85.4 MG/DL (ref 0–100)
LIPID PROFILE,FLP: NORMAL
MICROALBUMIN UR-MCNC: 1.7 MG/DL (ref 0–3)
MICROALBUMIN/CREAT UR-RTO: 9 MG/G (ref 0–30)
POTASSIUM SERPL-SCNC: 4.1 MMOL/L (ref 3.5–5.5)
PROT SERPL-MCNC: 6.7 G/DL (ref 6.4–8.2)
SODIUM SERPL-SCNC: 137 MMOL/L (ref 136–145)
TRIGL SERPL-MCNC: 143 MG/DL (ref ?–150)
VLDLC SERPL CALC-MCNC: 28.6 MG/DL

## 2020-04-29 PROCEDURE — 83036 HEMOGLOBIN GLYCOSYLATED A1C: CPT

## 2020-04-29 PROCEDURE — 80061 LIPID PANEL: CPT

## 2020-04-29 PROCEDURE — 36415 COLL VENOUS BLD VENIPUNCTURE: CPT

## 2020-04-29 PROCEDURE — 80053 COMPREHEN METABOLIC PANEL: CPT

## 2020-04-29 PROCEDURE — 82043 UR ALBUMIN QUANTITATIVE: CPT

## 2020-04-29 PROCEDURE — 82306 VITAMIN D 25 HYDROXY: CPT

## 2020-04-30 ENCOUNTER — TELEPHONE (OUTPATIENT)
Dept: INTERNAL MEDICINE CLINIC | Age: 55
End: 2020-04-30

## 2020-04-30 NOTE — TELEPHONE ENCOUNTER
Patient contacted, patient identified with two identifiers (Name & ). Patient aware of results per  and verbalizes understanding. Patient scheduled for f/u virtual visit in 2 weeks to discuss labs.

## 2020-04-30 NOTE — TELEPHONE ENCOUNTER
----- Message from Robbin Cho MD sent at 4/30/2020 11:27 AM EDT -----  Please schedule the patient for a follow-up appointment within the next 2 wks to discuss medication management per his most recent results. Kidney function labs are normal.  His vitamin D is low at 28.6. His A1c is up to 8.2 from 7.5. Liver function tests are normal.  His total cholesterol is 164. Triglycerides are 143. Note that they were 67 a year ago. His total cholesterol is 145 a year ago. His HDL is 50. His LDL is 85, up from 72 a year ago. We will need to adjust his diabetes medication and discuss a plan moving forward to better get his A1c down and improve his DM.     Dr. Guerra Friday  [facility] [address], [address] [place] [address] Str.  Phone: [phone]  Fax: [phone]

## 2020-04-30 NOTE — PROGRESS NOTES
Please schedule the patient for a follow-up appointment within the next 2 wks to discuss medication management per his most recent results. Kidney function labs are normal.  His vitamin D is low at 28.6. His A1c is up to 8.2 from 7.5. Liver function tests are normal.  His total cholesterol is 164. Triglycerides are 143. Note that they were 67 a year ago. His total cholesterol is 145 a year ago. His HDL is 50. His LDL is 85, up from 72 a year ago. We will need to adjust his diabetes medication and discuss a plan moving forward to better get his A1c down and improve his DM.     Dr. Clive Rangel  [facility], [facility], [facility]  Phone: [phone]  Fax: [phone]

## 2020-05-15 ENCOUNTER — VIRTUAL VISIT (OUTPATIENT)
Dept: INTERNAL MEDICINE CLINIC | Age: 55
End: 2020-05-15

## 2020-05-15 DIAGNOSIS — E11.69 DIABETES MELLITUS TYPE 2 IN OBESE (HCC): Primary | ICD-10-CM

## 2020-05-15 DIAGNOSIS — E55.9 VITAMIN D DEFICIENCY: ICD-10-CM

## 2020-05-15 DIAGNOSIS — E66.9 DIABETES MELLITUS TYPE 2 IN OBESE (HCC): Primary | ICD-10-CM

## 2020-05-15 RX ORDER — ERGOCALCIFEROL 1.25 MG/1
50000 CAPSULE ORAL
Qty: 12 CAP | Refills: 0 | Status: SHIPPED | OUTPATIENT
Start: 2020-05-15

## 2020-05-15 RX ORDER — METFORMIN HYDROCHLORIDE 1000 MG/1
1000 TABLET, FILM COATED, EXTENDED RELEASE ORAL DAILY
Qty: 90 TAB | Refills: 1 | Status: SHIPPED | OUTPATIENT
Start: 2020-05-15

## 2020-05-15 NOTE — TELEPHONE ENCOUNTER
New Rx pended - Please sign if appropriate. Last Visit: 4/24/20 with MD James Ameczua  Next Appointment: 10/23/20 with MD James Amezcua    Requested Prescriptions     Pending Prescriptions Disp Refills    glucose blood VI test strips (blood glucose test) strip 200 Strip 3     Sig: [employer] glucose test strips - patient tests glucose 2 times daily  Dx.  E11.69

## 2020-05-15 NOTE — PROGRESS NOTES
Jerica Duval is a 48 y.o. male who was seen by synchronous (real-time) audio-video technology on 5/15/2020. Assessment & Plan:   1. Type 2 DM: A1C is up to 8.5.   - Increasing his metformin to 1000mg daily ER  - Checking his A1C 1 wk before his follow up apt. - Low carb diet and weight reduction stressed. I will recheck his weight later this year. - Refilling his test strips    2. Vitamin D Deficiency:  - Ok to take OTC vitamin D but he states he is having a hard time finding it at the store. I will order a short course of prescription strength vitamin D for now. That will give him time to find OTC vitamin D to take thereafter. Lab review: labs are reviewed in the EHR and ordered as mentioned above     I have discussed the diagnosis with the patient and the intended plan as seen in the above orders. I have discussed medication side effects and warnings with the patient as well. I have reviewed the plan of care with the patient, accepted their input and they are in agreement with the treatment goals. All questions were answered. The patient understands the plan of care. Pt instructed if symptoms worsen to call the office or report to the ED for continued care. Greater than 50% of the visit time was spent in counseling and/or coordination of care. Voice recognition was used to generate this report, which may have resulted in some phonetic based errors in grammar and contents. Even though attempts were made to correct all the mistakes, some may have been missed, and remained in the body of the document. Subjective:   Jerica Duval was seen for   Chief Complaint   Patient presents with    Results     The patient is a 27-year-old male with history of anxiety/bipolar disorder, HLD, type 2 diabetes, HTN,  vitamin D deficiency, and eczema per EHR. 1. Type 2 DM: His most recent labs show:  Kidney function labs are normal. His A1c is up to 8.2 from 7.5.   Liver function tests are normal.  His total cholesterol is 164. Triglycerides are 143. Note that they were 67 a year ago. His total cholesterol is 145 a year ago. His HDL is 50. His LDL is 85, up from 72 a year ago. He is taking metformin 500mg once a day although it is ordered bid. His diet has been better since getting his lab results back. 2. Vitamin D Deficiency: His vitamin D is low at 28.6. Not taking vitamin D. Prior to Admission medications    Medication Sig Start Date End Date Taking? Authorizing Provider   metFORMIN (GLUCOPHAGE) 500 mg tablet Take 1 Tab by mouth two (2) times daily (with meals). 11/21/19   Hyun Connell MD   simvastatin (ZOCOR) 10 mg tablet TAKE 1 TABLET NIGHTLY 11/21/19   Hyun Connell MD   losartan (COZAAR) 50 mg tablet TAKE 1 TABLET DAILY 11/21/19   Hyun Connell MD   albuterol (PROVENTIL HFA, VENTOLIN HFA, PROAIR HFA) 90 mcg/actuation inhaler Take 2 Puffs by inhalation every six (6) hours as needed for Wheezing. 11/21/19   Hyun Connell MD   fluticasone propionate (FLONASE) 50 mcg/actuation nasal spray 2 Sprays by Both Nostrils route daily. 11/21/19   Hyun Connell MD   clindamycin (CLEOCIN T) 1 % external solution Apply  to affected area two (2) times a day. use thin film on affected area    ProviderJesse   triamcinolone acetonide (KENALOG) 0.1 % ointment Apply  to affected area two (2) times a day. use thin layer along affected rash areas 7/19/19   Hyun Connell MD   olopatadine (PATANOL) 0.1 % ophthalmic solution Administer 1 Drop to both eyes two (2) times a day. 4/4/19   Isaiah Naranjo MD   ergocalciferol (ERGOCALCIFEROL) 50,000 unit capsule Take 1 Cap by mouth every seven (7) days.  3/13/19   Vasiliy Goodwin, Isreal Huitron MD   glucose blood VI test strips (ASCENSIA AUTODISC VI, ONE TOUCH ULTRA TEST VI) strip Check Blood Sugar Fasting Once a day 9/24/14   Bereket Rivera, DO   BLOOD GLUCOSE STRIPS-DISPMETER (BLOOD GLUCOSE STRIP-DISP METER) kit 120 Strips by Does Not Apply route daily. 4/10/14   Rudie Gilford., NP   Blood-Glucose Meter monitoring kit Check Blood Sugar once daily 12/23/13   Bereket Rivera DO   lancets 32 gauge misc 1 Package by Does Not Apply route daily. 12/23/13   Bereket Rivera, DO     Allergies   Allergen Reactions    Latex Hives    Bee Pollen Anaphylaxis     Past Medical History:   Diagnosis Date    Diabetes ([facility] [address]     Eczema     History of seasonal allergies     Hypertension     Vitamin D deficiency      No past surgical history on file. Family History   Adopted: Yes   Family history unknown: Yes     Social History     Socioeconomic History    Marital status: SINGLE     Spouse name: Not on file    Number of children: Not on file    Years of education: 25    Highest education level: Not on file   Occupational History    Occupation:      Employer: [employer]   Tobacco Use    Smoking status: Current Every Day Smoker     Years: 15.00     Types: Cigarettes    Smokeless tobacco: Never Used    Tobacco comment: less than 1 pack daily   Substance and Sexual Activity    Alcohol use: Yes     Alcohol/week: 3.3 standard drinks     Types: 4 Cans of beer per week     Comment: moderate    Drug use: No    Sexual activity: Never     Partners: Female       ROS:  Gen: No fever/chills  HEENT: No sore throat, eye pain, ear pain, or congestion.  No HA  CV: No CP  Resp: No cough/SOB  GI: No abdominal pain  : No hematuria/dysuria  Derm: No rash  Neuro: No new paresthesias/weakness  Musc: No new myalgias/jt pain  Psych: No depression sx      Objective:     General: alert, cooperative, no distress   Mental  status: mental status: alert, oriented to person, place, and time, normal mood, behavior, speech, dress, motor activity, and thought processes   Resp: resp: normal effort and no respiratory distress   Neuro: neuro: no gross deficits   Skin: skin: no discoloration or lesions of concern on visible areas     LABS:  Lab Results Component Value Date/Time    Sodium 137 04/29/2020 08:19 AM    Potassium 4.1 04/29/2020 08:19 AM    Chloride 102 04/29/2020 08:19 AM    CO2 28 04/29/2020 08:19 AM    Anion gap 7 04/29/2020 08:19 AM    Glucose 193 (H) 04/29/2020 08:19 AM    BUN 9 04/29/2020 08:19 AM    Creatinine 0.89 04/29/2020 08:19 AM    BUN/Creatinine ratio 10 (L) 04/29/2020 08:19 AM    GFR est AA >60 04/29/2020 08:19 AM    GFR est non-AA >60 04/29/2020 08:19 AM    Calcium 8.6 04/29/2020 08:19 AM       Lab Results   Component Value Date/Time    Cholesterol, total 164 04/29/2020 08:19 AM    HDL Cholesterol 50 04/29/2020 08:19 AM    LDL, calculated 85.4 04/29/2020 08:19 AM    VLDL, calculated 28.6 04/29/2020 08:19 AM    Triglyceride 143 04/29/2020 08:19 AM    CHOL/HDL Ratio 3.3 04/29/2020 08:19 AM       Lab Results   Component Value Date/Time    WBC 9.7 03/13/2019 01:10 PM    HGB 14.7 03/13/2019 01:10 PM    HCT 46.0 03/13/2019 01:10 PM    PLATELET 995 11/55/1389 01:10 PM    MCV 95.6 03/13/2019 01:10 PM       Lab Results   Component Value Date/Time    Hemoglobin A1c 8.2 (H) 04/29/2020 08:19 AM    Hemoglobin A1c (POC) 6.8 11/21/2019 10:02 AM       Lab Results   Component Value Date/Time    TSH 0.91 03/13/2019 01:10 PM           Due to this being a TeleHealth  evaluation, many elements of the physical examination are unable to be assessed. The pt was seen by synchronous (real-time) audio-video technology, and/or her healthcare decision maker, is aware that this patient-initiated, Telehealth encounter is a billable service, with coverage as determined by her insurance carrier. She is aware that she may receive a bill and has provided verbal consent to proceed: Yes. The pt is being evaluated by a video visit encounter for concerns as above. A caregiver was present when appropriate.  Due to this being a TeleHealth encounter (During [employer]-61 public health emergency), evaluation of the following organ systems was limited: Vitals/Constitutional/EENT/Resp/CV/GI//MS/Neuro/Skin/Heme-Lymph-Imm. Pursuant to the emergency declaration under the [address] [place] [address] waiver authority and the [employer] Act, this Virtual  Visit was conducted, with patient's (and/or legal guardian's) consent, to reduce the patient's risk of exposure to COVID-19 and provide necessary medical care. Services were provided through a video synchronous discussion virtually to substitute for in-person clinic visit. Patient and provider were located at their individual homes. We discussed the expected course, resolution and complications of the diagnosis(es) in detail. Medication risks, benefits, costs, interactions, and alternatives were discussed as indicated. I advised him to contact the office if his condition worsens, changes or fails to improve as anticipated. He expressed understanding with the diagnosis(es) and plan.      Shaylee Mccoy MD

## 2020-05-26 RX ORDER — BLOOD-GLUCOSE METER
EACH MISCELLANEOUS
Qty: 1 EACH | Refills: 0 | Status: SHIPPED | OUTPATIENT
Start: 2020-05-26

## 2020-05-26 RX ORDER — LANCETS
EACH MISCELLANEOUS
Qty: 200 EACH | Refills: 11 | Status: SHIPPED | OUTPATIENT
Start: 2020-05-26

## 2020-05-26 NOTE — TELEPHONE ENCOUNTER
Call from [facility] requesting to change diabetic meter to Tae Connolly. He will aslo need test strips.  Does not need lancets    Ref #:   [id number]

## 2020-11-12 DIAGNOSIS — E11.69 DIABETES MELLITUS TYPE 2 IN OBESE (HCC): Primary | ICD-10-CM

## 2020-11-12 DIAGNOSIS — E11.69 DIABETES MELLITUS TYPE 2 IN OBESE (HCC): ICD-10-CM

## 2020-11-12 DIAGNOSIS — E66.9 DIABETES MELLITUS TYPE 2 IN OBESE (HCC): Primary | ICD-10-CM

## 2020-11-12 DIAGNOSIS — E66.9 DIABETES MELLITUS TYPE 2 IN OBESE (HCC): ICD-10-CM

## 2020-11-12 RX ORDER — METFORMIN HYDROCHLORIDE 1000 MG/1
TABLET, FILM COATED, EXTENDED RELEASE ORAL
Qty: 90 TAB | Refills: 1 | OUTPATIENT
Start: 2020-11-12

## 2020-11-12 NOTE — TELEPHONE ENCOUNTER
Please have the patient scheduled for blood work. I will check his A1c before I will refill his Metformin. I will adjust his medicine dose once I have this lab result. We will, please have him schedule for an office visit with me in April. Please make sure this appointment is for 30 minutes. Please schedule him for labs 1 week before his April appointment as well.     Dr. Chuy Gonsalves  [facility], [address] [place] [address], [address] [place] [address] Str.  Phone: [phone]  Fax: [phone]

## 2020-11-12 NOTE — LETTER
11/17/2020 7:43 AM 
 
Mr. Cunningham Tonsanto 
[address] [address] Dear Mr. Graham Xavier: 
 
Nestor Jamison missed you! Please call our office at [phone] and schedule a follow up appointment for your continued care.  
 
 
 
Sincerely, 
 
 
Haile Garcia MD

## 2022-03-18 PROBLEM — F17.200 NICOTINE DEPENDENCE: Status: ACTIVE | Noted: 2019-11-21

## 2022-03-18 PROBLEM — J30.9 ALLERGIC RHINITIS: Status: ACTIVE | Noted: 2019-07-23

## 2024-05-31 NOTE — PROGRESS NOTES
1. Have you been to the ER, urgent care clinic or hospitalized since your last visit? NO.     2. Have you seen or consulted any other health care providers outside of the [address] [place] [address] since your last visit (Include any pap smears or colon screening)? NO      Do you have an Advanced Directive? NO    Would you like information on Advanced Directives?  NO general